# Patient Record
Sex: FEMALE | Race: WHITE | Employment: STUDENT | URBAN - METROPOLITAN AREA
[De-identification: names, ages, dates, MRNs, and addresses within clinical notes are randomized per-mention and may not be internally consistent; named-entity substitution may affect disease eponyms.]

---

## 2017-02-08 ENCOUNTER — HOSPITAL ENCOUNTER (INPATIENT)
Facility: CLINIC | Age: 22
LOS: 5 days | Discharge: HOME | DRG: 885 | End: 2017-02-13
Attending: EMERGENCY MEDICINE | Admitting: PSYCHIATRY & NEUROLOGY
Payer: COMMERCIAL

## 2017-02-08 ENCOUNTER — OFFICE VISIT (OUTPATIENT)
Dept: FAMILY MEDICINE | Facility: CLINIC | Age: 22
End: 2017-02-08

## 2017-02-08 VITALS
WEIGHT: 138.7 LBS | SYSTOLIC BLOOD PRESSURE: 102 MMHG | HEART RATE: 83 BPM | OXYGEN SATURATION: 96 % | DIASTOLIC BLOOD PRESSURE: 68 MMHG | RESPIRATION RATE: 14 BRPM | HEIGHT: 67 IN | TEMPERATURE: 98.5 F | BODY MASS INDEX: 21.77 KG/M2

## 2017-02-08 DIAGNOSIS — F43.23 ADJUSTMENT DISORDER WITH MIXED ANXIETY AND DEPRESSED MOOD: ICD-10-CM

## 2017-02-08 DIAGNOSIS — R45.851 SUICIDAL IDEATION: ICD-10-CM

## 2017-02-08 DIAGNOSIS — R45.851 SUICIDAL IDEATION: Primary | ICD-10-CM

## 2017-02-08 LAB
AMPHETAMINES UR QL SCN: NORMAL
BARBITURATES UR QL: NORMAL
BENZODIAZ UR QL: NORMAL
CANNABINOIDS UR QL SCN: NORMAL
COCAINE UR QL: NORMAL
ETHANOL UR QL SCN: NORMAL
HCG UR QL: NEGATIVE
OPIATES UR QL SCN: NORMAL

## 2017-02-08 PROCEDURE — 99285 EMERGENCY DEPT VISIT HI MDM: CPT | Performed by: EMERGENCY MEDICINE

## 2017-02-08 PROCEDURE — 12400001 ZZH R&B MH UMMC

## 2017-02-08 PROCEDURE — 99285 EMERGENCY DEPT VISIT HI MDM: CPT | Mod: Z6 | Performed by: EMERGENCY MEDICINE

## 2017-02-08 PROCEDURE — 80307 DRUG TEST PRSMV CHEM ANLYZR: CPT | Performed by: FAMILY MEDICINE

## 2017-02-08 PROCEDURE — 81025 URINE PREGNANCY TEST: CPT | Performed by: FAMILY MEDICINE

## 2017-02-08 PROCEDURE — 80320 DRUG SCREEN QUANTALCOHOLS: CPT | Performed by: FAMILY MEDICINE

## 2017-02-08 PROCEDURE — 90791 PSYCH DIAGNOSTIC EVALUATION: CPT

## 2017-02-08 RX ORDER — HYDROXYZINE HYDROCHLORIDE 25 MG/1
25-50 TABLET, FILM COATED ORAL EVERY 4 HOURS PRN
Status: DISCONTINUED | OUTPATIENT
Start: 2017-02-08 | End: 2017-02-13 | Stop reason: HOSPADM

## 2017-02-08 RX ORDER — TRAZODONE HYDROCHLORIDE 50 MG/1
50 TABLET, FILM COATED ORAL
Status: DISCONTINUED | OUTPATIENT
Start: 2017-02-08 | End: 2017-02-13 | Stop reason: HOSPADM

## 2017-02-08 ASSESSMENT — ANXIETY QUESTIONNAIRES
IF YOU CHECKED OFF ANY PROBLEMS ON THIS QUESTIONNAIRE, HOW DIFFICULT HAVE THESE PROBLEMS MADE IT FOR YOU TO DO YOUR WORK, TAKE CARE OF THINGS AT HOME, OR GET ALONG WITH OTHER PEOPLE: VERY DIFFICULT
6. BECOMING EASILY ANNOYED OR IRRITABLE: MORE THAN HALF THE DAYS
3. WORRYING TOO MUCH ABOUT DIFFERENT THINGS: MORE THAN HALF THE DAYS
5. BEING SO RESTLESS THAT IT IS HARD TO SIT STILL: MORE THAN HALF THE DAYS
2. NOT BEING ABLE TO STOP OR CONTROL WORRYING: NEARLY EVERY DAY
1. FEELING NERVOUS, ANXIOUS, OR ON EDGE: MORE THAN HALF THE DAYS
7. FEELING AFRAID AS IF SOMETHING AWFUL MIGHT HAPPEN: SEVERAL DAYS
GAD7 TOTAL SCORE: 13

## 2017-02-08 ASSESSMENT — ENCOUNTER SYMPTOMS
HYPERACTIVE: 0
AGITATION: 0
HALLUCINATIONS: 0
DECREASED CONCENTRATION: 1
ACTIVITY CHANGE: 1
FATIGUE: 1
NERVOUS/ANXIOUS: 1
CONFUSION: 0
SLEEP DISTURBANCE: 1
DYSPHORIC MOOD: 1

## 2017-02-08 ASSESSMENT — PATIENT HEALTH QUESTIONNAIRE - PHQ9: 5. POOR APPETITE OR OVEREATING: SEVERAL DAYS

## 2017-02-08 NOTE — PROGRESS NOTES
Primary Care Behavioral Health Consult Note  Meeting was: Unscheduled  Others present: Dr. Otto; Emergency Medical Service (Present for conclusion of consultation to initiate transport)  Meeting lasted: Approximately 10 Minutes    Identifying Information and Presenting Problem:  Dr. Otto requested behavioral health consultation for this patient regarding assistance initiating/implementing Erin's protocol for transporting a patient in psychiatric crisis. The patient is a 21 year old Russian female experiencing active suicidal ideation with plan (i.e., Jumping off of a bridge).    Topics Discussed/Interventions Provided:     Assisted Dr. Otto with the initiation/implementation of Erin's protocol for transporting a patient in psychiatric crisis. Following initiation of the protocol, accompanied Dr. Otto to meet with the patient to express concerns regarding safety and discuss the decision to have the patient evaluated for potential hospitalization. The patient appeared somewhat unreceptive to this initially, though appeared more receptive after continued discussion of provider concerns. Informed the patient that EMS would transport her to the [facility] ([facility]) for evaluation. Indicated she was willing to be transported via ambulance. Following initiation of transportation, assisted Dr. Otto with the remainder of Erin's protocol (i.e., Contacting [facility] to inform them of provider concerns and reason for transporting for further evaluation).     Assessment:   Mental Status: Agnes Woods appeared generally alert and oriented. Dress was casual and appropriate to the weather and occasion. Grooming and hygiene were fair. Eye contact was limited. Speech was of normal volume and rate and was clear, coherent, and relevant. Mood was depressed with congruent affect. Thought processes were relevant, logical and goal-directed. Thought content was WNL with no evidence of psychotic or  paranoid features. Reported current active suicidal ideation with plan. Memory appeared grossly intact. Insight and judgment appeared limited and patient exhibited good impulse control during the appointment.     Does the patient appear to be at imminent risk of harm to self/others at this time? Yes (See Dr. Otto's documentation for crisis and risk assessment)    PHQ-9 SCORE 2/8/2017   Total Score 24     NANCIE-7 SCORE 2/8/2017   Total Score 13     Diagnoses (PROVISIONAL):    Deferred    Plan:    Patient transported via ambulance to the [facility] for evaluation secondary to psychiatric crisis, per Erin's protocol.     Dr. Otto requested callback from the [facility].    Consulted with Marian Otto and Preeti prior to, during, and following current consultation for care coordination.       Nathaniel Lombardi, Ph.D.  Behavioral Health Fellow

## 2017-02-08 NOTE — PROGRESS NOTES
Preceptor Attestation:   Patient seen and discussed with the resident. Assessment and plan reviewed with resident and agreed upon.   Supervising Physician:  Reilly Álvarez MD  [facility]

## 2017-02-08 NOTE — IP AVS SNAPSHOT
MRN:[id number]                      After Visit Summary   2/8/2017    Agnes Woods    MRN: [id number]           Patient Information     Date Of Birth          1995        About your hospital stay     You were admitted on:  February 8, 2017 You last received care in the:  Young Adult Inpatient Mental Health    You were discharged on:  February 13, 2017       Who to Call     For medical emergencies, please call 911.  For non-urgent questions about your medical care, please call your primary care provider or clinic, [phone]          Attending Provider     Provider Specialty    Lara Blood MD Emergency Medicine    Raheem Le MD Psychiatry       Primary Care Provider Office Phone # Fax #    Phyllis Otto [phone]       [facility] [address] [facility] [address]        Further instructions from your care team       Behavioral Discharge Planning and Instructions      Summary: You were admitted on 2/8/2017 to [facility] for suicidal ideation.  You were treated by Dr. Le and discharged on 02/10/2017.     Disposition: Discharged to home    Main Diagnosis:  Major depressive disorder, recurrent.    Bipolar disorder, depressed.      Health Care Follow-up Appointments:   Therapy Appointment  Call to schedule a follow-up appointment with your therapist within 7 days of discharge if possible  Provider: Michele Huston  Phone:[phone]     Medication Management   For a primary care provider contact:  [facility] [address] [place] [address]  Phone: [phone]    For a psychiatrist contact:   [facility]. [address] [place] [address]  Phone: [phone]    For DBT Groups you may call:  [facility] for an Intake interview at Phone: [phone]      Attend all scheduled appointments with your outpatient providers. Call at least 24  "hours in advance if you need to reschedule an appointment to ensure continued access to your outpatient providers.   Major Treatments, Procedures and Findings: You were provided with: a psychiatric assessment, assessed for medical stability, medication evaluation and/or management, group therapy and milieu management    Symptoms to Report: losing more sleep, mood getting worse or thoughts of suicide    Early warning signs can include:  increased depression or anxiety sleep disturbances increased thoughts or behaviors of suicide or self-harm     Safety and Wellness:  Take all medicines as directed.  Make no changes unless your doctor suggests them.      Follow treatment recommendations.  Refrain from alcohol and non-prescribed drugs.  If there is a concern for safety, call 911.    Resources: Mental health crisis response for your [facility] is offered 24 hours a day, 7 days a week. A trained counselor will assess your current situation, offer support and counseling and connect you with local resources. Please call  [facility] Crisis (COPE) Response - Adult [phone]  [facility] Crisis Response - Adult [phone]  Text 4 Life: txt \"LIFE\" to [phone] for immediate support and crisis intervention    The treatment team has appreciated the opportunity to work with you. Agnes, please take care and make your recovery a daily recovery. If you have any questions or concerns our unit number is [phone].  You will be receiving a follow-up phone call within the next three days from a representative from behavioral health.  You have identified the best phone number to reach you as [phone]              Pending Results     No orders found from 2/6/2017 to 2/9/2017.            Admission Information     Date & Time Department Dept. Phone    2/8/2017 Young Adult Inpatient Mental Health [phone]      Your Vitals Were     Blood Pressure Pulse Temperature Respirations Weight Last Period    102/56 100 98.3  F (36.8 " " C) (Oral) 16 60.9 kg (134 lb 3.2 oz) 2017 (LMP Unknown)    Pulse Oximetry BMI (Body Mass Index)                97% 21.02 kg/m2          [employer] Information     [employer] lets you send messages to your doctor, view your test results, renew your prescriptions, schedule appointments and more. To sign up, go to [email or web address] . Click on \"Log in\" on the left side of the screen, which will take you to the Welcome page. Then click on \"Sign up Now\" on the right side of the page.     You will be asked to enter the access code listed below, as well as some personal information. Please follow the directions to create your username and password.     Your access code is: [email or web address]  Expires: 3/8/2017  6:26 PM     Your access code will  in 90 days. If you need help or a new code, please call your [place] clinic or [phone].        Care EveryWhere ID     This is your Care EveryWhere ID. This could be used by other organizations to access your [place] medical records  [id number]           Review of your medicines      CONTINUE these medicines which have NOT CHANGED        Dose / Directions    Caffeine 100 MG Tabs        Dose:  200 mg   Take 200 mg by mouth every morning   Refills:  0                Protect others around you: Learn how to safely use, store and throw away your medicines at www.disposemymeds.org.             Medication List: This is a list of all your medications and when to take them. Check marks below indicate your daily home schedule. Keep this list as a reference.      Medications           Morning Afternoon Evening Bedtime As Needed    Caffeine 100 MG Tabs   Take 200 mg by mouth every morning                                  "

## 2017-02-08 NOTE — ED NOTES
Bed: ED09  Expected date: 2/8/17  Expected time: 4:41 PM  Means of arrival: Ambulance  Comments:  Dariusz 444  21y F SI

## 2017-02-08 NOTE — MR AVS SNAPSHOT
After Visit Summary   2017    Agnes Woods    MRN: [id number]           Patient Information     Date Of Birth          1995        Visit Information        Provider Department      2017 2:40 PM Phyllis Otto MD [facility]        Today's Diagnoses     Suicidal ideation    -  1    Adjustment disorder with mixed anxiety and depressed mood           Follow-ups after your visit        Who to contact     Please call your clinic at [phone] to:    Ask questions about your health    Make or cancel appointments    Discuss your medicines    Learn about your test results    Speak to your doctor   If you have compliments or concerns about an experience at your clinic, or if you wish to file a complaint, please contact [facility] Patient Relations at [phone] or email us at [email or web address]         Additional Information About Your Visit        MyChart Information     ChartCube is an electronic gateway that provides easy, online access to your medical records. With ChartCube, you can request a clinic appointment, read your test results, renew a prescription or communicate with your care team.     To sign up for qunbt visit the website at [email or web address]   You will be asked to enter the access code listed below, as well as some personal information. Please follow the directions to create your username and password.     Your access code is: [email or web address]  Expires: 3/8/2017  6:26 PM     Your access code will  in 90 days. If you need help or a new code, please contact your [facility] or call [phone] for assistance.        Care EveryWhere ID     This is your Care EveryWhere ID. This could be used by other organizations to access your [place] medical records  [id number]        Your Vitals Were     Pulse Temperature Respirations Height Last Period Pulse Oximetry    83 98.5  " F (36.9  C) (Oral) 14 5' 7\" (170.2 cm) 01/11/2017 96%    BMI (Body Mass Index)                   21.72 kg/m2            Blood Pressure from Last 3 Encounters:   02/12/17 102/56   02/08/17 102/68   12/08/16 121/72    Weight from Last 3 Encounters:   02/12/17 134 lb 3.2 oz (60.9 kg)   02/08/17 138 lb 11.2 oz (62.9 kg)   12/08/16 138 lb (62.6 kg)              Today, you had the following     No orders found for display       Primary Care Provider Office Phone # Fax #    Phyllis Otto [phone]       [facility] [address] [facility] [address]        Thank you!     Thank you for choosing [facility]  for your care. Our goal is always to provide you with excellent care. Hearing back from our patients is one way we can continue to improve our services. Please take a few minutes to complete the written survey that you may receive in the mail after your visit with us. Thank you!             Your Updated Medication List - Protect others around you: Learn how to safely use, store and throw away your medicines at www.disposemymeds.org.      Notice  As of 2/8/2017  4:50 PM    You have not been prescribed any medications.      "

## 2017-02-08 NOTE — IP AVS SNAPSHOT
Young Adult [facility] Mental Health    [facility] [address]    [address] [address]    Phone:  [phone]                                       After Visit Summary   2/8/2017    Agnes Woods    MRN: [id number]           After Visit Summary Signature Page     I have received my discharge instructions, and my questions have been answered. I have discussed any challenges I see with this plan with the nurse or doctor.    ..........................................................................................................................................  Patient/Patient Representative Signature      ..........................................................................................................................................  Patient Representative Print Name and Relationship to Patient    ..................................................               ................................................  Date                                            Time    ..........................................................................................................................................  Reviewed by Signature/Title    ...................................................              ..............................................  Date                                                            Time

## 2017-02-09 PROCEDURE — 12400001 ZZH R&B MH UMMC

## 2017-02-09 PROCEDURE — 25000132 ZZH RX MED GY IP 250 OP 250 PS 637: Performed by: PSYCHIATRY & NEUROLOGY

## 2017-02-09 PROCEDURE — 90853 GROUP PSYCHOTHERAPY: CPT

## 2017-02-09 PROCEDURE — 97150 GROUP THERAPEUTIC PROCEDURES: CPT | Mod: GO

## 2017-02-09 RX ORDER — DOXEPIN HYDROCHLORIDE 25 MG/1
25 CAPSULE ORAL
Status: DISCONTINUED | OUTPATIENT
Start: 2017-02-09 | End: 2017-02-13 | Stop reason: HOSPADM

## 2017-02-09 RX ORDER — OLANZAPINE 10 MG/1
10 TABLET ORAL
Status: DISCONTINUED | OUTPATIENT
Start: 2017-02-09 | End: 2017-02-13 | Stop reason: HOSPADM

## 2017-02-09 RX ORDER — ALUMINA, MAGNESIA, AND SIMETHICONE 2400; 2400; 240 MG/30ML; MG/30ML; MG/30ML
30 SUSPENSION ORAL EVERY 4 HOURS PRN
Status: DISCONTINUED | OUTPATIENT
Start: 2017-02-09 | End: 2017-02-13 | Stop reason: HOSPADM

## 2017-02-09 RX ORDER — OLANZAPINE 10 MG/2ML
10 INJECTION, POWDER, FOR SOLUTION INTRAMUSCULAR
Status: DISCONTINUED | OUTPATIENT
Start: 2017-02-09 | End: 2017-02-13 | Stop reason: HOSPADM

## 2017-02-09 RX ORDER — DOXEPIN HYDROCHLORIDE 25 MG/1
25 CAPSULE ORAL
Status: DISCONTINUED | OUTPATIENT
Start: 2017-02-09 | End: 2017-02-09

## 2017-02-09 RX ORDER — ACETAMINOPHEN 325 MG/1
650 TABLET ORAL EVERY 4 HOURS PRN
Status: DISCONTINUED | OUTPATIENT
Start: 2017-02-09 | End: 2017-02-13 | Stop reason: HOSPADM

## 2017-02-09 RX ORDER — BUSPIRONE HYDROCHLORIDE 10 MG/1
10 TABLET ORAL 3 TIMES DAILY PRN
Status: DISCONTINUED | OUTPATIENT
Start: 2017-02-09 | End: 2017-02-13 | Stop reason: HOSPADM

## 2017-02-09 RX ADMIN — ACETAMINOPHEN 650 MG: 325 TABLET, FILM COATED ORAL at 19:15

## 2017-02-09 ASSESSMENT — ANXIETY QUESTIONNAIRES: GAD7 TOTAL SCORE: 13

## 2017-02-09 ASSESSMENT — PATIENT HEALTH QUESTIONNAIRE - PHQ9: SUM OF ALL RESPONSES TO PHQ QUESTIONS 1-9: 24

## 2017-02-09 NOTE — PHARMACY-ADMISSION MEDICATION HISTORY
Admission medication history interview status for the 2/8/2017 admission is complete. See Epic admission navigator for allergy information, pharmacy, prior to admission medications and immunization status.     Medication history interview sources:  Patient - reliable historian    Changes made to PTA medication list (reason)  Added: caffeine 100mg tablets daily (helps headaches)  Deleted: multivitamin daily  Changed: none    Additional medication history information (including reliability of information, actions taken by pharmacist):  - Pt was more tearful and upset when mentioned that no smoking was allowed on unit. Stated that this would be a problem for her. She has never tried nicotine products but informed her that various nicotine options were available for her if needed.    Prior to Admission medications    Medication Sig Last Dose Taking? Auth Provider   Caffeine 100 MG TABS Take 100 mg by mouth every morning 2/8/2017 at am Yes Unknown, Entered By History       Medication history completed by: Lea Van, pharmacy intern
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
## 2017-02-09 NOTE — PROGRESS NOTES
02/08/17 2259   Patient Belongings   Patient Belongings wallet;keys;jewelry;cell phone/electronics;clothing   Belongings Search Yes   Clothing Search Yes   Second Staff Corinne     In Bin  -Leather jacket  -Hat  -Scarf  -Gloves  -1 pair long socks  -boots  -backpack   -ipad   -cigarettes/lighter   -headphones   -coin purse  On Patient  -Patterned leggings  -Gray t-shirt  -Maroon cardigan  -wool skirt  -bra  -underwear  -glasses  -folders/papers from school  -Bible  -gum    In Security Envelope ([id number])  -[place] passport  -Necklace (Christianity)  -[employer] (10118)  -Visa Debit (7956)  -50 Euros    Added 2/11/17:  Tank top, 2 books, underwear, long sleeved shirt    ADMISSION:  I am responsible for any personal items that are not sent to the safe or pharmacy. [facility] is not responsible for loss, theft or damage of any property in my possession.    Patient Signature _____________________ Date/Time _____________________    Staff Signature _______________________ Date/Time _____________________    2nd Staff person, if patient is unable/unwilling to sign  ___________________________________ Date/Time _____________________    DISCHARGE:  My personal items have been returned to me.   Patient Signature _____________________ Date/Time _____________________

## 2017-02-09 NOTE — PROGRESS NOTES
"   02/09/17 1600   Occupational Therapy   Type of Intervention structured groups   Response Participates with encouragement   Hours 2   Pt. Attended 2 of 2 scheduled OT sessions today.   Observations: pt participated in groups when invited, though pt appeared irritable such as when saying \"they lock the books up don't they? So I can't have them anytime?\" and \"the ceramics and boxes, I can't keep those in my room, so what's the point?\" During PM group pt asked \"what are we doing?\" and \"what was the assignment again?\" appearing to have difficulty concentrating needing instructions repeated. Otherwise, pt did contribute to group discussion.  Group Description:   Pt participated PM topic group, in sensory exploring the senses and interventions helping the pt to regulate and deal with stressors, feelings, and anxiety through sensory input. Pt were educated in available resources on the unit and engaged in discussion regarding accessible resources for calming the senses.   Pt participated in OT clinic, working on completing a self-initiated project facilitated by OT and graded to appropriate functional performance.    "

## 2017-02-09 NOTE — ED PROVIDER NOTES
History     Chief Complaint   Patient presents with     Depression     Pt went to [facility] counseling.Pt sent to [facility] clinic. She made statements about jumping off bridge. She was sent her for eval.     Suicidal     HPI  Agnes Woods is a 21 year old female who presented to [facility] clinic today due to worsening depression and feeling suicidal.  She says she has had years of feeling depressed and feeling suicidal.  She is from [place].  She has no family here. She does have friends.  She has a therapist she sees and feels is helpful to her.  She has hx of cutting.  She was at [employer] for school and switched to [employer] [employer].  She is struggling in school and went to [facility] today hoping they could help her so that she can somehow focus on school.  They sent her here on a transport hold.  She is very upset about this and would like to leave.  Denies cd issues.  She is not on mental health meds.          I have reviewed the Medications, Allergies, Past Medical and Surgical History, and Social History in the Epic system.    Review of Systems   Psychiatric/Behavioral: Positive for suicidal ideas, self-injury, dysphoric mood and decreased concentration. Negative for hallucinations. The patient is nervous/anxious.    All other systems reviewed and are negative.      Physical Exam   BP: 149/83 mmHg  Pulse: 105  Temp: 98.4  F (36.9  C)  Resp: 16  SpO2: 98 %  Physical Exam   Constitutional: She is oriented to person, place, and time. She appears well-developed and well-nourished. No distress.   Very disheveled appearing. Poor eye contact.    HENT:   Head: Normocephalic and atraumatic.   Right Ear: External ear normal.   Left Ear: External ear normal.   Nose: Nose normal.   Mouth/Throat: Oropharynx is clear and moist.   Eyes: EOM are normal. Pupils are equal, round, and reactive to light.   Neck: Normal range of motion. Neck supple.   Cardiovascular: Normal rate, regular rhythm and normal heart sounds.    Pulmonary/Chest:  Effort normal and breath sounds normal.   Abdominal: Soft.   Musculoskeletal: Normal range of motion.   Neurological: She is alert and oriented to person, place, and time.   Skin: Skin is warm and dry. She is not diaphoretic.   Psychiatric: Her speech is normal. Her mood appears anxious. Labile: emotionally dysregulated at times. She is withdrawn (controlled appearing). Cognition and memory are normal. She expresses inappropriate judgment. She exhibits a depressed mood. She expresses suicidal ideation. She expresses suicidal plans.   Nursing note and vitals reviewed.      ED Course     Procedures           Labs Ordered and Resulted from Time of ED Arrival Up to the Time of Departure from the ED   DRUG ABUSE SCREEN 6 CHEM DEP URINE ([facility])   HCG QUALITATIVE URINE       Assessments & Plan (with Medical Decision Making)   The [facility] due to concerns for safety.  According to the note from [facility] she expressed suicidal thoughts with a plan.  Her she appears disheveled.  It appears she has not showered for several days - her hair appears unwashed.  She appears controlled and at times becomes very tearful.  She appears quite depressed.  She has poor eye contact.  She would like to leave. Both myself and the DEC  have extensive concerns about her level of depression and safety.  She seems to minimize her level of depression.    I called her therapist, after her approval, and discussed his view of her safety and to help determine her safety risk.  He states he does worry about her safety.  I have chosen to place her on a 72 hour hold given our concerns.  I did explain to her that if the admitting team felt she was doing well, she could be d/c home before the hold expires.  She will be admitted to inpatient mental health for safety concerns.  She was quite distraught over this decision.  Her mood became quite dysregulated.     I have reviewed the nursing notes.    I have  reviewed the findings, diagnosis, plan and need for follow up with the patient.    Current Discharge Medication List          Final diagnoses:   Suicidal ideation       2/8/2017   [facility], [place], EMERGENCY DEPARTMENT      Lara Blood MD  02/11/17 1539    Lara Blood MD  02/11/17 3238

## 2017-02-09 NOTE — PROGRESS NOTES
Behavioral Health  Note  Behavioral Health  Spirituality Group Note    Unit 4AW    Name: Agnes Woods    YOB: 1995   MRN: [id number]    Age: 21 year old    Patient attended -led group, which included discussion of spirituality, coping with illness and building resilience.  Patient attended group for 1 hrs.  The patient actively participated in group discussion    Jaimie Mensah  Staff   Pager [phone]

## 2017-02-09 NOTE — PROGRESS NOTES
"Initial Psychosocial Assessment    I have reviewed the chart, met with the patient, and developed Care Plan.  Information for assessment was obtained from:  Clinical Interview and chart review.      Presenting Problem:  Patient is admitted on a 72 hour hold after making suicidal statements. She reports plan to jump off bridge.  She was placed on a hold after resisting plan for admission to hospital.  Patient also has recent injuries on her arm from cutting.  Patient feels her 72 hour hold is over reaction, that it was not that serious and she did not really plan on ending her life.  She said \" Of course I have thought of how I would do it, I have thought suicide for a long time, that does not mean I will do it.\"    History of Mental Health and Chemical Dependency:  Patient reports always having suicidal thoughts.  She also has history of self-harming behaviors, in particular cutting her arms.  She reports previously being on meds, but not recently because it takes too long to see a psychiatrist.    Patient reports no problems with alcohol or drugs.  She admits very occasional marijuana use, once every couple of weeks.  She has \"6\" drinks every Friday night, her beverage of choice is wine.    Family Description (Constellation, Family Psychiatric History):  Patient is one of 2 children born to Azerbaijani parents.  She has a sister 30 years old.  Her sister has a history of anorexia, currently in full remission.  Her father has another daughter from a previoius marriage. Patient reports she barely knows her and sees her rarely.    Patient's mother is in [place] at this time teaching a class.  Father is in [place].      Significant Life Events (Illness, Abuse, Trauma, Death):  Patient moved to [place] from [place] in 2009.  She states the move was for her to get a better education.  She moved from [place] to [place] 4 years ago to go to college.    Living Situation:  Patient rents a room in a house owned by parents of a " friend in the [place] area of [place].    Educational Background:  Patient graduated High School in [place].  She attended [employer] in [place], [place] and was kicked suspended for academic reasons.  She then attended [employer] from there she transferred to [employer] which she started this semester.     Occupational History:  Not emplyed she is on an academic visa, she can have work study with that visa, but feels she should focus on settling in to her new school.     Financial Status:  Patient is dependent on her parents for financial support.    Legal Issues:  None    Ethnic/Cultural Issues:  Causian.  Grew up in [place]    Spiritual Orientation:  Raised as Omani Mormonism, but not practicing.     Service History:  No    Social Functioning (organization, interests):  Patient has friends she spends time with.  She belongs to [employer]    Current Treatment Providers are:  Therapist- Jose Huston, [phone] (weekly appointments, has one scheduled next week)  Has no PCP- Went to [facility] on [address] on referral from mental health screening clinic at school. (Not likely to go back)    Social Service Assessment/Plan:  Patient to participate in Treatment milieu.  CTC will complete aftercare plan.  Patient appears to have had a long term struggle with depression, psychiatric follow up is recommended.

## 2017-02-09 NOTE — PROGRESS NOTES
Preceptor Attestation:  I have reviewed and agree with the behavioral health fellow's documentation for this visit.  I did not see the patient.  Supervising Clinical Psychologist:  Shama Santamaria PSYD LP

## 2017-02-09 NOTE — PLAN OF CARE
Problem: General Plan of Care (Inpatient Behavioral)  Goal: Individualization/Patient Specific Goal (IP Behavioral)  The patient and/or their representative will achieve their patient-specific goals related to the plan of care.    The patient-specific goals include:  Illness Management Recovery model: Objectives    Patient will identify reason(s) for hospitalization from their perspective.  Patient will identify a minimum of three goals for discharge.  Patient will identify a minimum of three triggers that may increase their symptoms.  Patient will identify a minimum of three coping skills they can do to stay well.   Patient will identify their support system to demonstrate readiness for discharge.     Illness Management Recovery model: Objectives  Patient will identify reason(s) for hospitalization from their perspective.  Patient will identify a minimum of three goals for discharge.  Patient will identify a minimum of three triggers that may increase their symptoms.  Patient will identify a minimum of three coping skills they can do to stay well.   Patient will identify their support system to demonstrate readiness for discharge.    Illness Management & Recovery assists patient to develop relapse prevention as  patient identifies triggers for relapse.  patient identifies a general wellness strategy.  patient identifies the warning signs that they are in danger of relapse.  patient identifies someone they count on to get feedback .  patient identifies ways to take action when in danger of relapse.  patient identifies way to cope with stress or other symptoms.   patient participates in self-reflection.    02/09/17 1441   General Plan of Care Individualized   Presenting Concerns Suicidal ideation, has thought of how she would do it, chronic suicidal ideation, chronic depression, history of self injuriy, argumentative and irritable   Patient Strengths Has vocational interests i.e., hobbies;Engagement in hobbies,  sports, arts, clubs;Involved in community;Stable housing;Financial stability   Areas of Vulnerability Irritability, negative about resources and being on a hold, isolated from family   Abuse Prevention Plan Will participate in treatment milieu, will develop a safety for discharge   Patient Participation in Plan patient completed Plan of Care   Family Participation in Plan padmaja be determined by CTC, geographical issues.   Copy of Plan Given Done     Personal Plan of Care    Reasons you are in hospital  1. Depression  2. Misunderstanding by medical professonals    Goals for Discharge  1Skills for success in college  2Supporting my social network.

## 2017-02-10 PROCEDURE — 12400001 ZZH R&B MH UMMC

## 2017-02-10 PROCEDURE — H2032 ACTIVITY THERAPY, PER 15 MIN: HCPCS

## 2017-02-10 PROCEDURE — 99231 SBSQ HOSP IP/OBS SF/LOW 25: CPT | Performed by: PSYCHIATRY & NEUROLOGY

## 2017-02-10 ASSESSMENT — ACTIVITIES OF DAILY LIVING (ADL)
ORAL_HYGIENE: INDEPENDENT
GROOMING: INDEPENDENT
DRESS: INDEPENDENT
GROOMING: INDEPENDENT
ORAL_HYGIENE: INDEPENDENT
DRESS: INDEPENDENT
LAUNDRY: WITH SUPERVISION
LAUNDRY: WITH SUPERVISION

## 2017-02-10 NOTE — PROGRESS NOTES
02/10/17 1700   Art Therapy   Type of Intervention structured groups   Response participates with cues/redirection   Hours 3.5   Pt was participatory, she completed her assessment and wanted to know about the results. Writer will meet with her 1:1 on Saturday to discuss. She complains when she feels she is uninformed, not in control such as when coffee goes from caffeinated to decaffeinated at noon. 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 Her drawings are very detailed. She talks about communism and politics a lot.

## 2017-02-10 NOTE — INTERIM SUMMARY
Agnes Woods was seen today on 02/10/2017.  The patient's progress was discussed with staff.  The patient's chart was reviewed.  The patient was able to sleep fairly well last night without using any psychotropic medication regimen.  This morning the patient's vital signs are stable.  Systolic of 114/61, temperature 99.3.  We discussed the events that led to her hospitalization.  The patient admits that at times that she went to [facility] prior to being sent to [facility], an emergency service, which eventually referred the patient to us; she did check the depression scale questionnaire so high that they felt that she was actively suicidal.  Now the patient is trying to minimize the whole event, although she accepts that her expression of suicidality was present in addition to reporting cutting herself.  However, she reports that she has spoken with some friends and she has a support system.  The patient's affect is not depressed at this time.  The patient's mood is stable.  The patient denies any suicidality.  We discussed the fact that this new school seemed to be an adjustment for her, which she seemed to be accepting and wanting to return back to school.  The patient admits that transition emotionally has been difficult due to missing connectivity to lot of friends that she had in previous school.  However, she is aware that she needs to return back to her therapy and to start receiving support at the school as well and to focus on her academics.  The patient reports that she has an appointment to see her therapist on Monday around 10.  The staff feels the patient overall has been fairly stable.  Therefore treatment plan is as follows:  Mental status will be monitored over the weekend.  The patient will participate in sanders milieu.  If the patient's mental status remains stable within next 48 hours the patient will be discharged to her outpatient services for continuation of her therapy.  No  self-destructive impulses reported.      DIAGNOSES:   1.  Major depression, recurrent.   2.  Bipolar disorder, depressed.   3.  Borderline personality.      MEDICATIONS AT THIS TIME:  None.        ATTESTATION:  I certify that the patient was seen by me today and was monitored to complete a mental status exam and psychiatric evaluation was done.  At this point, the patient will be monitored and will be seen over the weekend for followup and eventually discharged on Monday.         YOSELIN VALDEZ MD             D: 02/10/2017 13:37   T: 02/10/2017 14:52   MT: GONZALO      Name:     CHRIS LANGSTON   MRN:      [id number]        Account:        [id number]   :      1995           Admit Date:                                       Discharge Date:       Document: [id number]

## 2017-02-10 NOTE — PROGRESS NOTES
"Patient presented as agitated and irritable during check-in. She was upset that she finds herself in the hospital and argues that it is unnecessary. She is also concerned that any answer she gives will \"count against\" her ability to leave the hospital when her hold is up. This concern, although valid in some respects, borders on paranoia. She wondered why I asked about her appetite and did not want to give an answer that would contribute to her staying in the hospital longer. Labile---mood swings from severe agitation, to profusely apologetic for her agitation the next second. Reported pain in legs, due to not being able to move about freely. She took some tylenol to alleviate these symptoms.          02/09/17 2100   Behavioral Health   Hallucinations denies / not responding to hallucinations   Thinking paranoid   Orientation person: oriented;place: oriented;date: oriented   Memory baseline memory   Insight poor   Judgement impaired   Eye Contact at floor;into space   Affect irritable;tense;angry   Mood labile;irritable   Physical Appearance/Attire untidy   Hygiene neglected grooming - unclean body, hair, teeth   Suicidality other (see comments)  (denies)   Self Injury other (see comment)  (denies)   Activity restless;hyperactive (agitated, impulsive)   Speech pressured;flight of ideas   Medication Sensitivity no observed side effects;no stated side effects   Psychomotor / Gait balanced;steady   Behavioral Health Interventions   Depression assist patient in following safety plan;assist patient in developing safety plan;maintain safe secure environment;establish therapeutic relationship;assist with developing and utilizing healthy coping strategies   Social and Therapeutic Interventions (Depression) encourage socialization with peers;encourage effective boundaries with peers;encourage participation in therapeutic groups and milieu activities     "

## 2017-02-10 NOTE — PROGRESS NOTES
Unable to do check in, pt attending groups. No stated SI, SIB. Active and social in milieu, occasionally tense/irritable with staff. Seemed to have a better day today, though.  Added: this writer went to 30 to borrow their keyboard (piano). Pt was very happy to have a piano to practice on, as much of her stress here revolves around falling behind in classes and in practicing. She usually practices two hours a day and has not since admission. Pt very pleasant after seeing the keyboard come onto the unit through end of shift.     02/10/17 1400   Behavioral Health   Hallucinations denies / not responding to hallucinations   Thinking paranoid   Orientation person: oriented;place: oriented;date: oriented;time: oriented   Memory baseline memory   Insight denial of illness   Judgement impaired   Eye Contact at floor;at examiner   Affect irritable;tense   Mood irritable;labile   Physical Appearance/Attire neat   Hygiene well groomed   Suicidality other (see comments)  (denies)   Self Injury other (see comment)  (denies)   Activity restless   Speech coherent   Medication Sensitivity no observed side effects;no stated side effects   Psychomotor / Gait balanced;steady   Psycho Education   Type of Intervention 1:1 intervention   Response participates, initiates socially appropriate   Hours 0.5   Treatment Detail Taking Action   Activities of Daily Living   Hygiene/Grooming independent   Oral Hygiene independent   Dress independent   Laundry with supervision   Room Organization independent   Activity   Activity Level of Assistance independent   Behavioral Health Interventions   Depression maintain safety precautions;maintain safe secure environment;build upon strengths;assist with developing and utilizing healthy coping strategies   Social and Therapeutic Interventions (Depression) encourage socialization with peers;encourage effective boundaries with peers;encourage participation in therapeutic groups and milieu activities

## 2017-02-10 NOTE — PROGRESS NOTES
Writer checked in with patient this morning. She stated she does not need anything from us. She stated she was seeing her therapist twice a week and then reduced it to 1x time a week. She stated she has discussed with her therapist about seeing him 2ce a week and has an appointment to see him on Monday at 10am. She is requesting d/c today. She denied suicidal thought. Affect is bright. She has been attending groups.     Writer informed her MD will be here later in the day and will meet with her.
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
## 2017-02-10 NOTE — PLAN OF CARE
Problem: General Plan of Care (Inpatient Behavioral)  Goal: Team Discussion  Team Plan:   Outcome: No Change  Behavioral Team Discussion: (2/10/2017)    Continued Stay Criteria/Rationale: Patient admitted for Depression and Suicidal Ideations.    Plan: MD to assess patient today.     Participants: 4A Provider: Debra Naegele, APRN, CNS and Livia Burroughs, BradenD (4A Pharmacist); 4A RN's: Eder Fish, RN and Stephanie Urbano, RN; 4A CTC's: Jan Bowie (CTC) and Diana Blackwell (CTC);.    Summary/Recommendation: Patient admitted due to worsening symptoms of Depression. Providers will assess for treatment and discharge planning.     Medical/Physical:  See medical notes.     Progress: NOT ASSESSED

## 2017-02-11 PROCEDURE — 25000132 ZZH RX MED GY IP 250 OP 250 PS 637: Performed by: EMERGENCY MEDICINE

## 2017-02-11 PROCEDURE — H2032 ACTIVITY THERAPY, PER 15 MIN: HCPCS

## 2017-02-11 PROCEDURE — 99231 SBSQ HOSP IP/OBS SF/LOW 25: CPT | Performed by: PSYCHIATRY & NEUROLOGY

## 2017-02-11 PROCEDURE — 12400001 ZZH R&B MH UMMC

## 2017-02-11 RX ADMIN — Medication 25 MG: at 22:15

## 2017-02-11 ASSESSMENT — ACTIVITIES OF DAILY LIVING (ADL)
GROOMING: INDEPENDENT
ORAL_HYGIENE: INDEPENDENT
ORAL_HYGIENE: INDEPENDENT
DRESS: INDEPENDENT
GROOMING: INDEPENDENT
DRESS: INDEPENDENT

## 2017-02-11 ASSESSMENT — ENCOUNTER SYMPTOMS
DYSPHORIC MOOD: 1
HALLUCINATIONS: 0
DECREASED CONCENTRATION: 1
NERVOUS/ANXIOUS: 1

## 2017-02-11 NOTE — INTERIM SUMMARY
"PSYCHIATRIC PROGRESS NOTE      The patient, Agnes Woods, was seen today on 02/11/2017.  The staff reports that the patient has come to them stating that two of her friends \"want to take her home.\"  Apparently the patient contacted 2 friends from her previous school that have come in stating that she can be discharged to them.  However, in my meeting with the patient, I emphasized the fact that she was detained involuntarily due to the fact that she cut herself and explicitly expressed suicidal thoughts.  However, the patient is showing typical borderline manipulative behavior by trying to convince the staff as well as myself that she will be with her friends and she would be fine.  In the meantime, the patient herself reports that she did not sleep well last night.  She did not take the doxepin which was ordered for sleep last night and when I told her that it appears that she still has very clear vulnerability to stress and her insomnia last night was one sign of it, patient states that \"no there was too much light coming into my room.\"  The patient denies any active suicidal ideation.  I emphasized the fact that on weekends we cannot discharge her even though she is not actively suicidal due to the fact that her outpatient treatment team needs to be available and be updated about her treatment prior to her discharge.  In addition to that, I suggested that she take doxepin 25 mg tonight and the order will be changed to be given as a routine order rather than p.r.n.  The patient agrees with this plan.  No new lab test is done.  No other complaint is present.  Therefore, the plan is as follows:   Doxepin will be given 25 mg at bedtime.  The patient has not used any BuSpar p.r.n. and therefore remained on a p.r.n. basis.      DIAGNOSES:   1.  Bipolar disorder, depressed.   2.  Borderline personality disorder.        Treatment plan as has been outlined above.      ATTESTATION:  I certify that the patient was seen " exclusively by me and services were provided.         YOSELIN VALDEZ MD             D: 2017 14:35   T: 2017 14:52   MT: delta      Name:     CHRIS LANGSTON   MRN:      [id number]        Account:        [id number]   :      1995           Admit Date:                                       Discharge Date:       Document: [id number]

## 2017-02-11 NOTE — PROGRESS NOTES
Pt is still frustrated about the 72 hour hold system, doesn't believe she should still be here, but is feeling better after this writer gave her access to textbooks and found a keyboard piano to borrow from another unit. Pt is now more social in milieu and more pleasant upon approach. Says she is tired from having less caffeine than she is used to.      02/10/17 1900   Behavioral Health   Hallucinations denies / not responding to hallucinations   Thinking other (see comment)  (better, adequate)   Orientation person: oriented;place: oriented;date: oriented;time: oriented   Memory baseline memory   Insight other (see comment)   Judgement (improving)   Eye Contact at examiner   Affect full range affect   Mood anxious;mood is calm   Physical Appearance/Attire neat;attire appropriate to age and situation   Hygiene well groomed   Suicidality other (see comments)  (denies)   Self Injury other (see comment)  (denies)   Activity refusal   Speech coherent;clear   Medication Sensitivity no observed side effects;no stated side effects   Psychomotor / Gait balanced;steady   Psycho Education   Type of Intervention 1:1 intervention   Response participates, initiates socially appropriate   Hours 0.5   Treatment Detail Taking Action   Activities of Daily Living   Hygiene/Grooming independent   Oral Hygiene independent   Dress independent   Laundry with supervision   Room Organization independent   Activity   Activity Level of Assistance independent   Behavioral Health Interventions   Depression maintain safety precautions;maintain safe secure environment;assist with developing and utilizing healthy coping strategies;build upon strengths   Social and Therapeutic Interventions (Depression) encourage socialization with peers;encourage effective boundaries with peers;encourage participation in therapeutic groups and milieu activities

## 2017-02-11 NOTE — PROGRESS NOTES
Two visitors, pt said visit went well. Pt signed LUCRECIA for visitors. Visitors said that pt is being held unnecessarily, mentioned they may get a  to try to get her out early, 72 hour is up on Monday. They wanted to meet with doctor. Pt still frustrated she is here. [place] that patients should get more info about rights upon admission and a copy of the law that includes the 72 hour hold info. Pt is requesting a copy of her records. Nurse was looking into details of when/how she can get them. Pt got a few new clothes and some new books. Has been playing piano and participating in some groups. Denies SI/SIB/Depression.     02/11/17 1400   Behavioral Health   Hallucinations denies / not responding to hallucinations   Thinking other (see comment)  (improving)   Orientation person: oriented;place: oriented;date: oriented;time: oriented   Memory baseline memory   Insight other (see comment);denial of illness  (believes stay was unnecessary)   Judgement (improving)   Eye Contact at examiner   Affect tense;full range affect   Mood anxious;irritable   Physical Appearance/Attire attire appropriate to age and situation;neat   Hygiene other (see comment)  (no shower today)   Suicidality other (see comments)  (denies)   Self Injury other (see comment)  (denies)   Activity restless;other (see comment)  (active and social in milieu and with visitors)   Speech coherent   Medication Sensitivity no stated side effects;no observed side effects  (difficulty sleeping, does not believe it to be med-related)   Psychomotor / Gait balanced;steady   Psycho Education   Type of Intervention 1:1 intervention   Response participates, initiates socially appropriate   Hours 0.5   Treatment Detail Check in   Activities of Daily Living   Hygiene/Grooming independent   Oral Hygiene independent   Dress independent   Room Organization independent   Activity   Activity Level of Assistance independent   Behavioral Health Interventions   Depression  maintain safety precautions;maintain safe secure environment;assist with developing and utilizing healthy coping strategies;build upon strengths   Social and Therapeutic Interventions (Depression) encourage socialization with peers;encourage effective boundaries with peers;encourage participation in therapeutic groups and milieu activities

## 2017-02-12 PROCEDURE — 12400001 ZZH R&B MH UMMC

## 2017-02-12 PROCEDURE — H2032 ACTIVITY THERAPY, PER 15 MIN: HCPCS

## 2017-02-12 PROCEDURE — 99231 SBSQ HOSP IP/OBS SF/LOW 25: CPT | Performed by: PSYCHIATRY & NEUROLOGY

## 2017-02-12 ASSESSMENT — ACTIVITIES OF DAILY LIVING (ADL)
DRESS: STREET CLOTHES
LAUNDRY: WITH SUPERVISION
ORAL_HYGIENE: INDEPENDENT
GROOMING: INDEPENDENT

## 2017-02-12 NOTE — PROGRESS NOTES
02/11/17 1800   Art Therapy   Type of Intervention structured groups   Response participates with encouragement   Hours 3   Groups today  [employer] - Self Compassion [place]  Free Choice Group  Yoga and self Compassion Meditation  Group team art project about encouragement. Pt was participatory later morning. She said she didn't sleep well because of light and room checks during the night, so she slept this morning. Pt and writer had a conversation about acceptance because she gets angry a lot about feeling like she doesn't have control here. She was mad about reflection time,but she seemed to listen to the conversation about acceptance and that being mad takes a lot of energy. She complains often, but surprisingly she liked the group process project and said she hadn't done a collaborative project before and enjoyed it.

## 2017-02-12 NOTE — PROGRESS NOTES
Pt still insistent that admission was unnecessary. Fully expressive of herself. Denies SI/SIB/depression. Pt reading, still expecting a visitor, expects that to go well.     02/12/17 1307   Behavioral Health   Hallucinations denies / not responding to hallucinations   Thinking intact   Orientation person: oriented;place: oriented;date: oriented;time: oriented   Memory baseline memory   Insight denial of illness  (believes inpatient was unnecessary)   Judgement intact   Eye Contact at examiner   Affect full range affect;tense   Mood irritable;mood is calm   Physical Appearance/Attire neat;attire appropriate to age and situation;appears stated age   Hygiene well groomed   Suicidality other (see comments)  (denies)   Self Injury other (see comment)  (denies)   Activity restless;isolative   Speech coherent   Medication Sensitivity no observed side effects;no stated side effects   Psychomotor / Gait balanced;steady   Psycho Education   Type of Intervention 1:1 intervention   Response participates, initiates socially appropriate   Hours 0.5   Activities of Daily Living   Hygiene/Grooming independent   Oral Hygiene independent   Dress street clothes   Laundry with supervision   Room Organization independent   Activity   Activity Level of Assistance independent   Behavioral Health Interventions   Depression maintain safety precautions;maintain safe secure environment;assist with developing and utilizing healthy coping strategies;build upon strengths   Social and Therapeutic Interventions (Depression) encourage socialization with peers;encourage effective boundaries with peers;encourage participation in therapeutic groups and milieu activities

## 2017-02-13 VITALS
DIASTOLIC BLOOD PRESSURE: 56 MMHG | WEIGHT: 134.2 LBS | TEMPERATURE: 98.3 F | RESPIRATION RATE: 16 BRPM | SYSTOLIC BLOOD PRESSURE: 102 MMHG | BODY MASS INDEX: 21.02 KG/M2 | OXYGEN SATURATION: 97 % | HEART RATE: 100 BPM

## 2017-02-13 PROCEDURE — 99238 HOSP IP/OBS DSCHRG MGMT 30/<: CPT | Performed by: PSYCHIATRY & NEUROLOGY

## 2017-02-13 ASSESSMENT — ACTIVITIES OF DAILY LIVING (ADL)
DRESS: INDEPENDENT
GROOMING: INDEPENDENT
ORAL_HYGIENE: INDEPENDENT
LAUNDRY: WITH SUPERVISION

## 2017-02-13 NOTE — PLAN OF CARE
Problem: Depressive Symptoms  Goal: Depressive Symptoms  Signs and symptoms of listed problems will be absent or manageable.  Patient will report decrease or absence of suicidal ideation  Patient will report decrease in depression and anxiety  Patient will report reduction in self-harm thoughts and abstain from self injurious behaviors on unit  Patient will participate in 50 percent of groups on the unit  Patient will report improved sleep    Patient, prior to discharge, will:  -verbalize decrease in depressive signs/symptoms  -verbalize a decrease in anxiety   -verbalize an understanding of medication regimen   -verbalize absence of SI/SIB   -develop a safety plan  -identify a support system   -will participate in coordination of discharge planning    To promote safety/ mental health    Patient identified the following-  Triggers:  ----------  Wellness Strategies:  ----------  Warning Signs:  ----------  Feedback (people they would like to receive feedback from if early warning signs - ex. Friends, family, partner/spouse, support groups):  ----------  Taking Action:  ----------  Ways to [place]: Music, headphones and reading.      Self-Reflection & Planning.  Assessed patient s progress completing forms related to Illness Management Recovery (including Personal Plan of Care, Adult Coping Plan, and My Support and Coping Plan) and assisted as needed.    Encouraged patient to continue to consider triggers, wellness strategies, early warning signs, feedback from others, actions to take to prevent relapse, and coping strategies as part of a plan to remain well after leaving the hospital.             Outcome: Adequate for Discharge Date Met:  02/13/17  Patient discharged from  at 1050, all belongings returned to patient. AVS discussed with patient, she denies any concerns or questions. No medications were ordered for patient at discharge. Patient did not want to sign belongings sheet because one of her items had been  "documented incorrectly (1000 Rubles had been documented as 50 Euros). Patient acknowledges that she the 1000 Rubles are hers and she did not come with 50 Euros. Patient started jumping up and down, jokingly saying \"I want my 50 euros! You wrote down that I have 50 Euros!\". Writer acknowledged that staff do need to be careful about accurate documentation, and verified that patient is not missing 50 euros. Patient still refused to sign belonging sheet even though she states she has received all of her belongings.       "

## 2017-02-13 NOTE — PLAN OF CARE
Problem: Depressive Symptoms  Goal: Depressive Symptoms  Signs and symptoms of listed problems will be absent or manageable.  Patient will report decrease or absence of suicidal ideation  Patient will report decrease in depression and anxiety  Patient will report reduction in self-harm thoughts and abstain from self injurious behaviors on unit  Patient will participate in 50 percent of groups on the unit  Patient will report improved sleep    Patient, prior to discharge, will:  -verbalize decrease in depressive signs/symptoms  -verbalize a decrease in anxiety   -verbalize an understanding of medication regimen   -verbalize absence of SI/SIB   -develop a safety plan  -identify a support system   -will participate in coordination of discharge planning    To promote safety/ mental health    Patient identified the following-  Triggers:  ----------  Wellness Strategies:  ----------  Warning Signs:  ----------  Feedback (people they would like to receive feedback from if early warning signs - ex. Friends, family, partner/spouse, support groups):  ----------  Taking Action:  ----------  Ways to [place]: Music, headphones and reading.      Self-Reflection & Planning.  Assessed patient s progress completing forms related to Illness Management Recovery (including Personal Plan of Care, Adult Coping Plan, and My Support and Coping Plan) and assisted as needed.    Encouraged patient to continue to consider triggers, wellness strategies, early warning signs, feedback from others, actions to take to prevent relapse, and coping strategies as part of a plan to remain well after leaving the hospital.             Outcome: Improving    02/12/17 2939   Depressive Symptoms   Depressive Symptoms Assessed all   Depressive Symptoms Present anxiety   48 HOUR ASSESSMENT: Pt had a quiet evening.  Calm and visible in the milieu.  Did not attend groups or socialize with peers.  Pt does pace up and down the hallway.  Did not play the piano today.   "Declined to rate her anxiety or depression stating\"I cannot rate mine, I don't see how other people can\".  States appetite is \"too good\".  Sleep is ok. Denies all medication side effect but reports she  has menstrual pain but declined pain medication.      "

## 2017-02-13 NOTE — INTERIM SUMMARY
Agnes Woods was seen today on 02/12/2017.  The patient's progress was discussed with staff.  The chart was reviewed.  During meeting today patient's affect appears to be noticeably brighter.  We reviewed events that led to her hospitalization.  We discussed the fact of how she escalated with her expression in writing and behavior (in the past cutting herself) to the point that she had to be hospitalized against her will.  We discussed a number of factors, methods and tools that she can use to deal with her tension, including communication with people that she relies on as friends and also a source of support such as that her therapist, to use a biological agent to help her to get through a tough time such as more low dose of psychotropic medications.  The third is physical activities that can be utilized in view of her cutting herself to deal with internal tension and frustration such as exercise.  The patient agrees to review these facts with her therapist.  The patient was able to sleep better after taking half a tablet of trazodone.        REVIEW OF SYSTEMS:  Does not indicate any medical difficulties or any physical complaints.        VITAL SIGNS:  The patient's vital signs are stable.  Systolic is 102 over diastolic of 56.  Temperature 99.2, pulse 100.      MENTAL STATUS:  Basically is vastly improved.  There are no suicidal ideations present.  Cognitive function is good.  There are no psychotic symptoms present.      DIAGNOSTIC IMPRESSION:   1.  Major depressive disorder, recurrent.   2.  Bipolar disorder, depressed.   3.  Borderline personality disorder.      TREATMENT PLAN:  The patient will be discharged to outpatient followup as the patient had been receiving prior to coming to the hospital after the patient's  has adequate time to contact her outpatient treatment team and to inform them of patient's progress and availability for followup treatment.        I certify that patient  exclusively was seen by me and the above-mentioned services were provided.         YOSELIN VALDEZ MD             D: 2017 16:22   T: 2017 16:41   MT: DENA      Name:     CHRIS LANGSTON   MRN:      [id number]        Account:        [id number]   :      1995           Admit Date:                                       Discharge Date:       Document: [id number]

## 2017-02-13 NOTE — DISCHARGE INSTRUCTIONS
Behavioral Discharge Planning and Instructions      Summary: You were admitted on 2/8/2017 to [facility] for suicidal ideation.  You were treated by Dr. Le and discharged on 02/10/2017.     Disposition: Discharged to home    Main Diagnosis:  Major depressive disorder, recurrent.    Bipolar disorder, depressed.      Health Care Follow-up Appointments:   Therapy Appointment  Call to schedule a follow-up appointment with your therapist within 7 days of discharge if possible  Provider: Michele Huston  Phone:[phone]     Medication Management   For a primary care provider contact:  [facility] [address] [place] [address]  Phone: [phone]    For a psychiatrist contact:   [facility] [address] [place] [address] [address] [place] [address]  Phone: [phone]    For DBT Groups you may call:  [facility] for an Intake interview at Phone: [phone]      Attend all scheduled appointments with your outpatient providers. Call at least 24 hours in advance if you need to reschedule an appointment to ensure continued access to your outpatient providers.   Major Treatments, Procedures and Findings: You were provided with: a psychiatric assessment, assessed for medical stability, medication evaluation and/or management, group therapy and milieu management    Symptoms to Report: losing more sleep, mood getting worse or thoughts of suicide    Early warning signs can include:  increased depression or anxiety sleep disturbances increased thoughts or behaviors of suicide or self-harm     Safety and Wellness:  Take all medicines as directed.  Make no changes unless your doctor suggests them.      Follow treatment recommendations.  Refrain from alcohol and non-prescribed drugs.  If there is a concern for safety, call [phone].    Resources: Mental health crisis response for your formerly [facility] is offered 24 hours a day, 7 days a week. A trained counselor  "will assess your current situation, offer support and counseling and connect you with local resources. Please call  [facility] Crisis (COPE) Response - Adult [phone]  [facility] Crisis Response - Adult [phone]  Text 4 Life: txt \"LIFE\" to 25709 for immediate support and crisis intervention    The treatment team has appreciated the opportunity to work with you. Agnes, please take care and make your recovery a daily recovery. If you have any questions or concerns our unit number is [phone].  You will be receiving a follow-up phone call within the next three days from a representative from behavioral health.  You have identified the best phone number to reach you as [phone]            "

## 2017-12-04 ENCOUNTER — OFFICE VISIT (OUTPATIENT)
Dept: FAMILY MEDICINE | Facility: CLINIC | Age: 22
End: 2017-12-04
Payer: COMMERCIAL

## 2017-12-04 VITALS
RESPIRATION RATE: 18 BRPM | TEMPERATURE: 99.1 F | SYSTOLIC BLOOD PRESSURE: 116 MMHG | BODY MASS INDEX: 22.48 KG/M2 | WEIGHT: 143.5 LBS | DIASTOLIC BLOOD PRESSURE: 76 MMHG | HEART RATE: 96 BPM | OXYGEN SATURATION: 98 %

## 2017-12-04 DIAGNOSIS — R53.82 CHRONIC FATIGUE: ICD-10-CM

## 2017-12-04 DIAGNOSIS — F33.1 MAJOR DEPRESSIVE DISORDER, RECURRENT EPISODE, MODERATE (H): Primary | ICD-10-CM

## 2017-12-04 DIAGNOSIS — Z30.011 ENCOUNTER FOR INITIAL PRESCRIPTION OF CONTRACEPTIVE PILLS: ICD-10-CM

## 2017-12-04 DIAGNOSIS — Z00.00 ROUTINE GENERAL MEDICAL EXAMINATION AT A HEALTH CARE FACILITY: ICD-10-CM

## 2017-12-04 DIAGNOSIS — Z11.3 SCREEN FOR STD (SEXUALLY TRANSMITTED DISEASE): ICD-10-CM

## 2017-12-04 LAB
ALBUMIN SERPL-MCNC: 4.2 G/DL (ref 3.4–5)
ALP SERPL-CCNC: 79 U/L (ref 40–150)
ALT SERPL W P-5'-P-CCNC: 21 U/L (ref 0–50)
ANION GAP SERPL CALCULATED.3IONS-SCNC: 10 MMOL/L (ref 3–14)
AST SERPL W P-5'-P-CCNC: 17 U/L (ref 0–45)
BASOPHILS # BLD AUTO: 0 10E9/L (ref 0–0.2)
BASOPHILS NFR BLD AUTO: 0.5 %
BILIRUB SERPL-MCNC: 0.3 MG/DL (ref 0.2–1.3)
BUN SERPL-MCNC: 10 MG/DL (ref 7–30)
CALCIUM SERPL-MCNC: 9.2 MG/DL (ref 8.5–10.1)
CHLORIDE SERPL-SCNC: 103 MMOL/L (ref 94–109)
CO2 SERPL-SCNC: 24 MMOL/L (ref 20–32)
CREAT SERPL-MCNC: 0.7 MG/DL (ref 0.52–1.04)
DEPRECATED CALCIDIOL+CALCIFEROL SERPL-MC: 16 UG/L (ref 20–75)
DIFFERENTIAL METHOD BLD: NORMAL
EOSINOPHIL # BLD AUTO: 0.3 10E9/L (ref 0–0.7)
EOSINOPHIL NFR BLD AUTO: 4.2 %
ERYTHROCYTE [DISTWIDTH] IN BLOOD BY AUTOMATED COUNT: 13.1 % (ref 10–15)
GFR SERPL CREATININE-BSD FRML MDRD: >90 ML/MIN/1.7M2
GLUCOSE SERPL-MCNC: 68 MG/DL (ref 70–99)
HBV SURFACE AB SERPL IA-ACNC: 63.16 M[IU]/ML
HCT VFR BLD AUTO: 40.9 % (ref 35–47)
HGB BLD-MCNC: 13.3 G/DL (ref 11.7–15.7)
HIV 1+2 AB+HIV1 P24 AG SERPL QL IA: NONREACTIVE
LYMPHOCYTES # BLD AUTO: 1.9 10E9/L (ref 0.8–5.3)
LYMPHOCYTES NFR BLD AUTO: 29.8 %
MCH RBC QN AUTO: 30 PG (ref 26.5–33)
MCHC RBC AUTO-ENTMCNC: 32.5 G/DL (ref 31.5–36.5)
MCV RBC AUTO: 92 FL (ref 78–100)
MONOCYTES # BLD AUTO: 0.4 10E9/L (ref 0–1.3)
MONOCYTES NFR BLD AUTO: 6.9 %
NEUTROPHILS # BLD AUTO: 3.6 10E9/L (ref 1.6–8.3)
NEUTROPHILS NFR BLD AUTO: 58.6 %
PLATELET # BLD AUTO: 359 10E9/L (ref 150–450)
POTASSIUM SERPL-SCNC: 3.8 MMOL/L (ref 3.4–5.3)
PROT SERPL-MCNC: 8.2 G/DL (ref 6.8–8.8)
RBC # BLD AUTO: 4.44 10E12/L (ref 3.8–5.2)
SODIUM SERPL-SCNC: 137 MMOL/L (ref 133–144)
TSH SERPL DL<=0.005 MIU/L-ACNC: 0.72 MU/L (ref 0.4–4)
VIT B12 SERPL-MCNC: 299 PG/ML (ref 193–986)
WBC # BLD AUTO: 6.2 10E9/L (ref 4–11)

## 2017-12-04 PROCEDURE — 86706 HEP B SURFACE ANTIBODY: CPT | Performed by: FAMILY MEDICINE

## 2017-12-04 PROCEDURE — 87389 HIV-1 AG W/HIV-1&-2 AB AG IA: CPT | Performed by: FAMILY MEDICINE

## 2017-12-04 PROCEDURE — 87491 CHLMYD TRACH DNA AMP PROBE: CPT | Performed by: FAMILY MEDICINE

## 2017-12-04 PROCEDURE — 86780 TREPONEMA PALLIDUM: CPT | Performed by: FAMILY MEDICINE

## 2017-12-04 PROCEDURE — 82607 VITAMIN B-12: CPT | Performed by: FAMILY MEDICINE

## 2017-12-04 PROCEDURE — 99395 PREV VISIT EST AGE 18-39: CPT | Performed by: FAMILY MEDICINE

## 2017-12-04 PROCEDURE — 87591 N.GONORRHOEAE DNA AMP PROB: CPT | Performed by: FAMILY MEDICINE

## 2017-12-04 PROCEDURE — 80050 GENERAL HEALTH PANEL: CPT | Performed by: FAMILY MEDICINE

## 2017-12-04 PROCEDURE — 82306 VITAMIN D 25 HYDROXY: CPT | Performed by: FAMILY MEDICINE

## 2017-12-04 PROCEDURE — 99214 OFFICE O/P EST MOD 30 MIN: CPT | Mod: 25 | Performed by: FAMILY MEDICINE

## 2017-12-04 PROCEDURE — 36415 COLL VENOUS BLD VENIPUNCTURE: CPT | Performed by: FAMILY MEDICINE

## 2017-12-04 RX ORDER — VENLAFAXINE HYDROCHLORIDE 37.5 MG/1
CAPSULE, EXTENDED RELEASE ORAL
Qty: 30 CAPSULE | Refills: 1 | Status: ON HOLD | OUTPATIENT
Start: 2017-12-04 | End: 2017-12-20

## 2017-12-04 RX ORDER — NORETHINDRONE ACETATE AND ETHINYL ESTRADIOL .03; 1.5 MG/1; MG/1
1 TABLET ORAL DAILY
Qty: 84 TABLET | Refills: 1 | Status: SHIPPED | OUTPATIENT
Start: 2017-12-04 | End: 2018-06-08

## 2017-12-04 ASSESSMENT — PATIENT HEALTH QUESTIONNAIRE - PHQ9
5. POOR APPETITE OR OVEREATING: NOT AT ALL
SUM OF ALL RESPONSES TO PHQ QUESTIONS 1-9: 13
SUM OF ALL RESPONSES TO PHQ QUESTIONS 1-9: 13
10. IF YOU CHECKED OFF ANY PROBLEMS, HOW DIFFICULT HAVE THESE PROBLEMS MADE IT FOR YOU TO DO YOUR WORK, TAKE CARE OF THINGS AT HOME, OR GET ALONG WITH OTHER PEOPLE: VERY DIFFICULT

## 2017-12-04 ASSESSMENT — ANXIETY QUESTIONNAIRES
5. BEING SO RESTLESS THAT IT IS HARD TO SIT STILL: NOT AT ALL
3. WORRYING TOO MUCH ABOUT DIFFERENT THINGS: NOT AT ALL
7. FEELING AFRAID AS IF SOMETHING AWFUL MIGHT HAPPEN: MORE THAN HALF THE DAYS
1. FEELING NERVOUS, ANXIOUS, OR ON EDGE: SEVERAL DAYS
2. NOT BEING ABLE TO STOP OR CONTROL WORRYING: SEVERAL DAYS
GAD7 TOTAL SCORE: 6
6. BECOMING EASILY ANNOYED OR IRRITABLE: MORE THAN HALF THE DAYS
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
## 2017-12-04 NOTE — MR AVS SNAPSHOT
After Visit Summary   12/4/2017    Agnes Woods    MRN: [id number]           Patient Information     Date Of Birth          1995        Visit Information        Provider Department      12/4/2017 10:20 AM Selina Asif MD [facility]        Today's Diagnoses     Major depressive disorder, recurrent episode, moderate (H)    -  1    Chronic fatigue        Screen for STD (sexually transmitted disease)        Encounter for initial prescription of contraceptive pills        Routine general medical examination at a health care facility          Care Instructions      Preventive Health Recommendations  Female Ages 18 to 25     Yearly exam:     See your health care provider every year in order to  o Review health changes.   o Discuss preventive care.    o Review your medicines if your doctor has prescribed any.      You should be tested each year for STDs (sexually transmitted diseases).       After age 20, talk to your provider about how often you should have cholesterol testing.      Starting at age 21, get a Pap test every three years. If you have an abnormal result, your doctor may have you test more often.      If you are at risk for diabetes, you should have a diabetes test (fasting glucose).     Shots:     Get a flu shot each year.     Get a tetanus shot every 10 years.     Consider getting the shot (vaccine) that prevents cervical cancer (Gardasil).    Nutrition:     Eat at least 5 servings of fruits and vegetables each day.    Eat whole-grain bread, whole-wheat pasta and brown rice instead of white grains and rice.    Talk to your provider about Calcium and Vitamin D.     Lifestyle    Exercise at least 150 minutes a week each week (30 minutes a day, 5 days a week). This will help you control your weight and prevent disease.    Limit alcohol to one drink per day.    No smoking.     Wear sunscreen to prevent skin cancer.    See your dentist every six  "months for an exam and cleaning.          Follow-ups after your visit        Who to contact     If you have questions or need follow up information about today's clinic visit or your schedule please contact [facility] directly at [phone].  Normal or non-critical lab and imaging results will be communicated to you by [employer], letter or phone within 4 business days after the clinic has received the results. If you do not hear from us within 7 days, please contact the clinic through [employer] or phone. If you have a critical or abnormal lab result, we will notify you by phone as soon as possible.  Submit refill requests through [employer] or call your pharmacy and they will forward the refill request to us. Please allow 3 business days for your refill to be completed.          Additional Information About Your Visit        [employer] Information     [employer]orse lets you send messages to your doctor, view your test results, renew your prescriptions, schedule appointments and more. To sign up, go to [email or web address] . Click on \"Log in\" on the left side of the screen, which will take you to the Welcome page. Then click on \"Sign up Now\" on the right side of the page.     You will be asked to enter the access code listed below, as well as some personal information. Please follow the directions to create your username and password.     Your access code is: [email or web address]  Expires: 3/4/2018 10:52 AM     Your access code will  in 90 days. If you need help or a new code, please call your [facility] clinic or [phone].        Care EveryWhere ID     This is your Care EveryWhere ID. This could be used by other organizations to access your [facility] medical records  [id number]        Your Vitals Were     Pulse Temperature Respirations Last Period Pulse Oximetry Breastfeeding?    96 99.1  F (37.3  C) (Tympanic) 18 2017 (Exact Date) 98% No    BMI (Body Mass Index)                   22.48 kg/m2         "    Blood Pressure from Last 3 Encounters:   12/04/17 116/76   02/12/17 102/56   02/08/17 102/68    Weight from Last 3 Encounters:   12/04/17 143 lb 8 oz (65.1 kg)   02/12/17 134 lb 3.2 oz (60.9 kg)   02/08/17 138 lb 11.2 oz (62.9 kg)              We Performed the Following     25- OH-Vitamin D     Anti Treponema     CBC with platelets differential     Chlamydia trachomatis PCR     Comprehensive metabolic panel     Hepatitis B Surface Antibody     HIV Antigen Antibody Combo     Neisseria gonorrhoeae PCR     TSH with free T4 reflex     Vitamin B12          Today's Medication Changes          These changes are accurate as of: 12/4/17 10:52 AM.  If you have any questions, ask your nurse or doctor.               Start taking these medicines.        Dose/Directions    norethindrone-ethinyl estradiol 1.5-30 MG-MCG per tablet   Commonly known as:  MICROGESTIN 1.5/30   Used for:  Encounter for initial prescription of contraceptive pills   Started by:  Selina Asif MD        Dose:  1 tablet   Take 1 tablet by mouth daily   Quantity:  84 tablet   Refills:  1       venlafaxine 37.5 MG 24 hr capsule   Commonly known as:  EFFEXOR-XR   Used for:  Major depressive disorder, recurrent episode, moderate (H)   Started by:  Selina Asif MD        Take 1 capsule daily for 14 days, then take 2 capsules daily.   Quantity:  30 capsule   Refills:  1            Where to get your medicines      These medications were sent to [facility] [place], [place] - [address]  [address], [place] [address]     Phone:  [phone]     norethindrone-ethinyl estradiol 1.5-30 MG-MCG per tablet    venlafaxine 37.5 MG 24 hr capsule                Primary Care Provider Office Phone # Fax #    Phyllis Otto [phone]       [facility] [address]   [facility] [address]        Equal Access to Services     NAT MCCONNELL AH: Brian fisher  Izabela, waavivada luanupadaha, qapaolata kaholly magana, neptali lawssilvano aj. So [facility] [phone].    ATENCIÓN: Si galdino woodson, tiene a cid disposición servicios gratuitos de asistencia lingüística. Royer al [phone].    We comply with applicable federal civil rights laws and [place] laws. We do not discriminate on the basis of race, color, national origin, age, disability, sex, sexual orientation, or gender identity.            Thank you!     Thank you for choosing [facility]  for your care. Our goal is always to provide you with excellent care. Hearing back from our patients is one way we can continue to improve our services. Please take a few minutes to complete the written survey that you may receive in the mail after your visit with us. Thank you!             Your Updated Medication List - Protect others around you: Learn how to safely use, store and throw away your medicines at www.disposemymeds.org.          This list is accurate as of: 12/4/17 10:52 AM.  Always use your most recent med list.                   Brand Name Dispense Instructions for use Diagnosis    Caffeine 100 MG Tabs      Take 200 mg by mouth every morning        norethindrone-ethinyl estradiol 1.5-30 MG-MCG per tablet    MICROGESTIN 1.5/30    84 tablet    Take 1 tablet by mouth daily    Encounter for initial prescription of contraceptive pills       venlafaxine 37.5 MG 24 hr capsule    EFFEXOR-XR    30 capsule    Take 1 capsule daily for 14 days, then take 2 capsules daily.    Major depressive disorder, recurrent episode, moderate (H)

## 2017-12-05 LAB
C TRACH DNA SPEC QL NAA+PROBE: NEGATIVE
N GONORRHOEA DNA SPEC QL NAA+PROBE: NEGATIVE
SPECIMEN SOURCE: NORMAL
SPECIMEN SOURCE: NORMAL
T PALLIDUM IGG+IGM SER QL: NEGATIVE

## 2017-12-05 ASSESSMENT — PATIENT HEALTH QUESTIONNAIRE - PHQ9: SUM OF ALL RESPONSES TO PHQ QUESTIONS 1-9: 13

## 2017-12-05 ASSESSMENT — ANXIETY QUESTIONNAIRES: GAD7 TOTAL SCORE: 6

## 2017-12-07 ENCOUNTER — TELEPHONE (OUTPATIENT)
Dept: FAMILY MEDICINE | Facility: CLINIC | Age: 22
End: 2017-12-07

## 2017-12-07 NOTE — TELEPHONE ENCOUNTER
Patient called for lab results. Ordering doctors comments given to patient.  She will get some Vitamin D as advised.  She has made an appt for a follow up to discuss her medications and complete PAP.

## 2017-12-13 ENCOUNTER — OFFICE VISIT (OUTPATIENT)
Dept: FAMILY MEDICINE | Facility: CLINIC | Age: 22
End: 2017-12-13
Payer: COMMERCIAL

## 2017-12-13 VITALS
HEART RATE: 89 BPM | SYSTOLIC BLOOD PRESSURE: 132 MMHG | WEIGHT: 147 LBS | BODY MASS INDEX: 23.02 KG/M2 | TEMPERATURE: 97.8 F | RESPIRATION RATE: 20 BRPM | DIASTOLIC BLOOD PRESSURE: 76 MMHG | OXYGEN SATURATION: 98 %

## 2017-12-13 DIAGNOSIS — S41.112A LACERATION OF LEFT UPPER EXTREMITY, INITIAL ENCOUNTER: Primary | ICD-10-CM

## 2017-12-13 DIAGNOSIS — F43.10 PTSD (POST-TRAUMATIC STRESS DISORDER): ICD-10-CM

## 2017-12-13 PROCEDURE — 99214 OFFICE O/P EST MOD 30 MIN: CPT | Performed by: FAMILY MEDICINE

## 2017-12-13 NOTE — MR AVS SNAPSHOT
After Visit Summary   12/13/2017    Agnes Woods    MRN: [id number]           Patient Information     Date Of Birth          1995        Visit Information        Provider Department      12/13/2017 6:00 PM Geoff Ash MD [facility]        Care Instructions    Change dressing daily with non stick dressing.  Keep clean and dry.  Follow-up as planned next week with primary.  Consider discussing variations of PTSD with your therapist.  Continue to ask for help if you feel thoughts of hurting yourself.  Put the crisis line in your phone.            Follow-ups after your visit        Your next 10 appointments already scheduled     Dec 21, 2017  1:20 PM CST   Office Visit with Selina Asif MD   [facility] ([facility])    [address] [place] [address] [id number] [phone]           Bring a current list of meds and any records pertaining to this visit. For Physicals, please bring immunization records and any forms needing to be filled out. Please arrive 10 minutes early to complete paperwork.              Who to contact     If you have questions or need follow up information about today's clinic visit or your schedule please contact [facility] directly at [phone].  Normal or non-critical lab and imaging results will be communicated to you by MyChart, letter or phone within 4 business days after the clinic has received the results. If you do not hear from us within 7 days, please contact the clinic through [employer] or phone. If you have a critical or abnormal lab result, we will notify you by phone as soon as possible.  Submit refill requests through [employer] or call your pharmacy and they will forward the refill request to us. Please allow 3 business days for your refill to be completed.          Additional Information About Your Visit        MyChart Information     [employer]  gives you secure access to your electronic health record. If you see a primary care provider, you can also send messages to your care team and make appointments. If you have questions, please call your primary care clinic.  If you do not have a primary care provider, please call [phone] and they will assist you.        Care EveryWhere ID     This is your Care EveryWhere ID. This could be used by other organizations to access your [facility] medical records  [id number]        Your Vitals Were     Pulse Temperature Respirations Last Period Pulse Oximetry Breastfeeding?    89 97.8  F (36.6  C) (Tympanic) 20 12/01/2017 (Exact Date) 98% No    BMI (Body Mass Index)                   23.02 kg/m2            Blood Pressure from Last 3 Encounters:   12/13/17 132/76   12/04/17 116/76   02/12/17 102/56    Weight from Last 3 Encounters:   12/13/17 147 lb (66.7 kg)   12/04/17 143 lb 8 oz (65.1 kg)   02/12/17 134 lb 3.2 oz (60.9 kg)              Today, you had the following     No orders found for display       Primary Care Provider Office Phone # Fax #    Phyllis Otto [phone]       [facility] [address] [place] [address]        Equal Access to Services     NAT MCCONNELL : Hadii julienne oquendoo Sojose martin, waaxda luqadaha, qaybta kaalmada adeegyada, neptali rocha. So [facility] [phone].    ATENCIÓN: Si habla español, tiene a cid disposición servicios gratuitos de asistencia lingüística. ame al [phone].    We comply with applicable federal civil rights laws and [place] laws. We do not discriminate on the basis of race, color, national origin, age, disability, sex, sexual orientation, or gender identity.            Thank you!     Thank you for choosing [facility]  for your care. Our goal is always to provide you with excellent care. Hearing back from our patients is one way we can continue to improve our services. Please take  a few minutes to complete the written survey that you may receive in the mail after your visit with us. Thank you!             Your Updated Medication List - Protect others around you: Learn how to safely use, store and throw away your medicines at www.disposemymeds.org.          This list is accurate as of: 12/13/17  6:45 PM.  Always use your most recent med list.                   Brand Name Dispense Instructions for use Diagnosis    Caffeine 100 MG Tabs      Take 200 mg by mouth every morning        norethindrone-ethinyl estradiol 1.5-30 MG-MCG per tablet    MICROGESTIN 1.5/30    84 tablet    Take 1 tablet by mouth daily    Encounter for initial prescription of contraceptive pills       venlafaxine 37.5 MG 24 hr capsule    EFFEXOR-XR    30 capsule    Take 1 capsule daily for 14 days, then take 2 capsules daily.    Major depressive disorder, recurrent episode, moderate (H)

## 2017-12-14 NOTE — PATIENT INSTRUCTIONS
Change dressing daily with non stick dressing.  Keep clean and dry.  Follow-up as planned next week with primary.  Consider discussing variations of PTSD with your therapist.  Continue to ask for help if you feel thoughts of hurting yourself.  Put the crisis line in your phone.
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
## 2017-12-15 ENCOUNTER — HOSPITAL ENCOUNTER (INPATIENT)
Facility: CLINIC | Age: 22
LOS: 4 days | Discharge: HOME | DRG: 883 | End: 2017-12-20
Attending: EMERGENCY MEDICINE | Admitting: PSYCHIATRY & NEUROLOGY
Payer: COMMERCIAL

## 2017-12-15 DIAGNOSIS — E55.9 VITAMIN D DEFICIENCY: ICD-10-CM

## 2017-12-15 DIAGNOSIS — S61.511A WRIST LACERATION, RIGHT, INITIAL ENCOUNTER: ICD-10-CM

## 2017-12-15 DIAGNOSIS — F41.9 ANXIETY: Primary | ICD-10-CM

## 2017-12-15 DIAGNOSIS — F10.929 ALCOHOLIC INTOXICATION WITH COMPLICATION (H): ICD-10-CM
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
## 2017-12-15 DIAGNOSIS — F33.1 MODERATE EPISODE OF RECURRENT MAJOR DEPRESSIVE DISORDER (H): ICD-10-CM

## 2017-12-15 DIAGNOSIS — Z72.89 SELF-INJURIOUS BEHAVIOR: ICD-10-CM

## 2017-12-15 LAB — ALCOHOL BREATH TEST: 0.13 (ref 0–0.01)

## 2017-12-15 PROCEDURE — 99285 EMERGENCY DEPT VISIT HI MDM: CPT | Mod: 25 | Performed by: EMERGENCY MEDICINE

## 2017-12-15 PROCEDURE — 99285 EMERGENCY DEPT VISIT HI MDM: CPT | Mod: Z6 | Performed by: EMERGENCY MEDICINE

## 2017-12-15 PROCEDURE — 12002 RPR S/N/AX/GEN/TRNK2.6-7.5CM: CPT | Mod: Z6 | Performed by: EMERGENCY MEDICINE

## 2017-12-15 NOTE — IP AVS SNAPSHOT
[address] [address]    Phone:  [phone]                                       After Visit Summary   12/15/2017    Agnes Woods    MRN: [id number]           After Visit Summary Signature Page     I have received my discharge instructions, and my questions have been answered. I have discussed any challenges I see with this plan with the nurse or doctor.    ..........................................................................................................................................  Patient/Patient Representative Signature      ..........................................................................................................................................  Patient Representative Print Name and Relationship to Patient    ..................................................               ................................................  Date                                            Time    ..........................................................................................................................................  Reviewed by Signature/Title    ...................................................              ..............................................  Date                                                            Time

## 2017-12-15 NOTE — IP AVS SNAPSHOT
MRN:[id number]                      After Visit Summary   12/15/2017    Agnes Woods    MRN: [id number]           Thank you!     Thank you for choosing [facility] for your care. Our goal is always to provide you with excellent care.        Patient Information     Date Of Birth          1995        Designated Caregiver       Most Recent Value    Caregiver    Will someone help with your care after discharge? no      About your hospital stay     You were admitted on:  December 16, 2017 You last received care in the:  [facility]    You were discharged on:  December 20, 2017       Who to Call     For medical emergencies, please call 911.  For non-urgent questions about your medical care, please call your primary care provider or clinic, [phone]          Attending Provider     Provider Specialty    Yoan Knox MD Emergency Medicine    Natanael, Richard Cam MD Emergency Medicine    Adam Mojica MD Psychiatry       Primary Care Provider Office Phone # Fax #    Selina Asif [phone]      Your next 10 appointments already scheduled     Dec 21, 2017  1:20 PM CST   Office Visit with Selina Asif MD   [facility] ([facility]    [address] [place] [id number] [phone]           Bring a current list of meds and any records pertaining to this visit. For Physicals, please bring immunization records and any forms needing to be filled out. Please arrive 10 minutes early to complete paperwork.              Further instructions from your care team        Behavioral Discharge Planning and Instructions      Summary:  You were admitted on 12/15/2017 due to self injurious behavior and not recalling the event.  You were treated by Dr. Adam Mojica MD and discharged on 12/21/17 from [facility]      Principal Diagnosis: Major Depressive Disorder, Borderline  Personality Disorder      Health Care Follow-up Appointments:   [facility] [phone]  [address] [facility]  Dr. Christie Asif Wednesday December 21st @ 1:20    Therapist  Jose Huston  [phone]  [address]  [facility]  Friday December 22nd @ 1:00      Attend all scheduled appointments with your outpatient providers. Call at least 24 hours in advance if you need to reschedule an appointment to ensure continued access to your outpatient providers.   Major Treatments, Procedures and Findings:  You were provided with: a psychiatric assessment and medication evaluation and/or management, medical treatment    Symptoms to Report: feeling more aggressive, increased confusion, losing more sleep, mood getting worse or thoughts of suicide    Early warning signs can include: increased depression or anxiety sleep disturbances increased thoughts or behaviors of suicide or self-harm  increased unusual thinking, such as paranoia or hearing voices    Safety and Wellness:  Take all medicines as directed.  Make no changes unless your doctor suggests them.      Follow treatment recommendations.  Refrain from alcohol and non-prescribed drugs.  If there is a concern for safety, call 911.    Resources:   Crisis Intervention: 418.946.3906 or 806-018-5458 (TTY: 382.878.7700).  Call anytime for help.  National [place] on Mental Illness (www.[place].org): 355.885.8560 or 019-910-9627.  Mental Health Consumer/Survivor Network of [place] (www.mhcsn.net): 565.706.8275 or 197-779-8609  [facility] Crisis (COPE) Response - Adult [phone]    The treatment team has appreciated the opportunity to work with you.     If you have any questions or concerns our unit number is [phone].  You may be receiving a follow-up phone call within the next three days from a representative from behavioral health.          Pending Results     No orders found from 12/13/2017 to 12/16/2017.            Statement of Approval      "Ordered          12/20/17 1121  I have reviewed and agree with all the recommendations and orders detailed in this document.  EFFECTIVE NOW     Approved and electronically signed by:  Adam Mojica MD             Admission Information     Date & Time Provider Department Dept. Phone    12/15/2017 Adam Mojica MD  [facility] [phone]      Your Vitals Were     Blood Pressure Pulse Temperature Respirations Height Weight    125/64 93 99.1  F (37.3  C) (Oral) 17 1.702 m (5' 7\") 64.4 kg (142 lb)    Last Period Pulse Oximetry BMI (Body Mass Index)             12/01/2017 (Exact Date) 97% 22.24 kg/m2         MyChart Information     [employer] gives you secure access to your electronic health record. If you see a primary care provider, you can also send messages to your care team and make appointments. If you have questions, please call your primary care clinic.  If you do not have a primary care provider, please call [phone] and they will assist you.        Care EveryWhere ID     This is your Care EveryWhere ID. This could be used by other organizations to access your [facility] medical records  [id number]        Equal Access to Services     NAT MCCONNELL : Hadii julienne Gurrola, bright zimmerman, kenyatta magana, neptali rocha. So [facility] [phone].    ATENCIÓN: Si habla español, tiene a cid disposición servicios gratuitos de asistencia lingüística. Llame al [phone].    We comply with applicable federal civil rights laws and [place] laws. We do not discriminate on the basis of race, color, national origin, age, disability, sex, sexual orientation, or gender identity.               Review of your medicines      START taking        Dose / Directions    DRISDOL 65487 UNITS Caps   Used for:  Vitamin D deficiency        Dose:  69329 Units   Start taking on:  12/23/2017   Take 50,000 Units by mouth every 7 days   Quantity:  4 capsule   Refills:  0       " hydrOXYzine 25 MG tablet   Commonly known as:  ATARAX   Used for:  Anxiety        Dose:  25-50 mg   Take 1-2 tablets (25-50 mg) by mouth every 4 hours as needed for anxiety   Quantity:  90 tablet   Refills:  1       lamoTRIgine 25 MG tablet   Commonly known as:  LaMICtal   Used for:  Suicide and self-inflicted injury by cutting and piercing instrument, initial encounter (H)        Dose:  25 mg   Start taking on:  12/21/2017   Take 1 tablet (25 mg) by mouth daily   Quantity:  60 tablet   Refills:  1       venlafaxine 75 MG Tb24 24 hr tablet   Commonly known as:  EFFEXOR-ER   Used for:  Anxiety   Replaces:  venlafaxine 37.5 MG 24 hr capsule        Dose:  75 mg   Start taking on:  12/21/2017   Take 1 tablet (75 mg) by mouth daily (with breakfast)   Quantity:  30 each   Refills:  1         CONTINUE these medicines which have NOT CHANGED        Dose / Directions    Caffeine 100 MG Tabs        Dose:  200 mg   Take 200 mg by mouth every morning   Refills:  0       norethindrone-ethinyl estradiol 1.5-30 MG-MCG per tablet   Commonly known as:  MICROGESTIN 1.5/30   Used for:  Encounter for initial prescription of contraceptive pills        Dose:  1 tablet   Take 1 tablet by mouth daily   Quantity:  84 tablet   Refills:  1       VITAMIN D (CHOLECALCIFEROL) PO        Dose:  5000 Units   Take 5,000 Units by mouth daily   Refills:  0         STOP taking     venlafaxine 37.5 MG 24 hr capsule   Commonly known as:  EFFEXOR-XR   Replaced by:  venlafaxine 75 MG Tb24 24 hr tablet                Where to get your medicines      These medications were sent to [place], [place] - [address]  [address] [address]     Phone:  [phone]     DRISDOL 30624 UNITS Caps    hydrOXYzine 25 MG tablet    lamoTRIgine 25 MG tablet    venlafaxine 75 MG Tb24 24 hr tablet                Protect others around you: Learn how to safely use, store and throw away your medicines at  www.disposemymeds.org.             Medication List: This is a list of all your medications and when to take them. Check marks below indicate your daily home schedule. Keep this list as a reference.      Medications           Morning Afternoon Evening Bedtime As Needed    Caffeine 100 MG Tabs   Take 200 mg by mouth every morning   Last time this was given:  200 mg on 12/20/2017  9:11 AM                                DRISDOL 42618 UNITS Caps   Take 50,000 Units by mouth every 7 days   Start taking on:  12/23/2017                                hydrOXYzine 25 MG tablet   Commonly known as:  ATARAX   Take 1-2 tablets (25-50 mg) by mouth every 4 hours as needed for anxiety                                lamoTRIgine 25 MG tablet   Commonly known as:  LaMICtal   Take 1 tablet (25 mg) by mouth daily   Start taking on:  12/21/2017   Last time this was given:  25 mg on 12/20/2017  9:11 AM                                norethindrone-ethinyl estradiol 1.5-30 MG-MCG per tablet   Commonly known as:  MICROGESTIN 1.5/30   Take 1 tablet by mouth daily   Last time this was given:  1 tablet on 12/20/2017  9:13 AM                                venlafaxine 75 MG Tb24 24 hr tablet   Commonly known as:  EFFEXOR-ER   Take 1 tablet (75 mg) by mouth daily (with breakfast)   Start taking on:  12/21/2017   Last time this was given:  75 mg on 12/20/2017  9:11 AM                                VITAMIN D (CHOLECALCIFEROL) PO   Take 5,000 Units by mouth daily

## 2017-12-16 PROBLEM — Z72.89 SELF-INJURIOUS BEHAVIOR: Status: ACTIVE | Noted: 2017-12-16

## 2017-12-16 PROBLEM — F32.A DEPRESSION: Status: ACTIVE | Noted: 2017-12-16

## 2017-12-16 LAB
AMPHETAMINES UR QL SCN: NEGATIVE
BARBITURATES UR QL: NEGATIVE
BENZODIAZ UR QL: NEGATIVE
CANNABINOIDS UR QL SCN: NEGATIVE
COCAINE UR QL: NEGATIVE
ETHANOL UR QL SCN: POSITIVE
HCG UR QL: NEGATIVE
OPIATES UR QL SCN: NEGATIVE

## 2017-12-16 PROCEDURE — 99223 1ST HOSP IP/OBS HIGH 75: CPT | Mod: AI | Performed by: NURSE PRACTITIONER

## 2017-12-16 PROCEDURE — 90715 TDAP VACCINE 7 YRS/> IM: CPT | Performed by: EMERGENCY MEDICINE

## 2017-12-16 PROCEDURE — 90471 IMMUNIZATION ADMIN: CPT | Performed by: EMERGENCY MEDICINE

## 2017-12-16 PROCEDURE — 81025 URINE PREGNANCY TEST: CPT | Performed by: FAMILY MEDICINE

## 2017-12-16 PROCEDURE — 90791 PSYCH DIAGNOSTIC EVALUATION: CPT

## 2017-12-16 PROCEDURE — 80307 DRUG TEST PRSMV CHEM ANLYZR: CPT | Performed by: FAMILY MEDICINE

## 2017-12-16 PROCEDURE — 25000132 ZZH RX MED GY IP 250 OP 250 PS 637: Performed by: PSYCHIATRY & NEUROLOGY

## 2017-12-16 PROCEDURE — 25000125 ZZHC RX 250: Performed by: EMERGENCY MEDICINE

## 2017-12-16 PROCEDURE — 0HQDXZZ REPAIR RIGHT LOWER ARM SKIN, EXTERNAL APPROACH: ICD-10-PCS | Performed by: EMERGENCY MEDICINE

## 2017-12-16 PROCEDURE — 12400007 ZZH R&B MH INTERMEDIATE UMMC

## 2017-12-16 PROCEDURE — 12002 RPR S/N/AX/GEN/TRNK2.6-7.5CM: CPT | Performed by: EMERGENCY MEDICINE

## 2017-12-16 PROCEDURE — 80320 DRUG SCREEN QUANTALCOHOLS: CPT | Performed by: FAMILY MEDICINE

## 2017-12-16 PROCEDURE — 25000125 ZZHC RX 250

## 2017-12-16 PROCEDURE — 25000128 H RX IP 250 OP 636: Performed by: EMERGENCY MEDICINE

## 2017-12-16 RX ORDER — ALUMINA, MAGNESIA, AND SIMETHICONE 2400; 2400; 240 MG/30ML; MG/30ML; MG/30ML
30 SUSPENSION ORAL EVERY 4 HOURS PRN
Status: DISCONTINUED | OUTPATIENT
Start: 2017-12-16 | End: 2017-12-20 | Stop reason: HOSPADM

## 2017-12-16 RX ORDER — HYDROXYZINE HYDROCHLORIDE 25 MG/1
25-50 TABLET, FILM COATED ORAL EVERY 4 HOURS PRN
Status: DISCONTINUED | OUTPATIENT
Start: 2017-12-16 | End: 2017-12-18

## 2017-12-16 RX ORDER — LIDOCAINE HYDROCHLORIDE AND EPINEPHRINE 10; 10 MG/ML; UG/ML
INJECTION, SOLUTION INFILTRATION; PERINEURAL
Status: COMPLETED
Start: 2017-12-16 | End: 2017-12-16

## 2017-12-16 RX ORDER — ACETAMINOPHEN 325 MG/1
650 TABLET ORAL EVERY 4 HOURS PRN
Status: DISCONTINUED | OUTPATIENT
Start: 2017-12-16 | End: 2017-12-20 | Stop reason: HOSPADM

## 2017-12-16 RX ORDER — HYDROXYZINE HYDROCHLORIDE 25 MG/1
25-50 TABLET, FILM COATED ORAL EVERY 4 HOURS PRN
Status: DISCONTINUED | OUTPATIENT
Start: 2017-12-16 | End: 2017-12-20 | Stop reason: HOSPADM

## 2017-12-16 RX ORDER — NORETHINDRONE ACETATE AND ETHINYL ESTRADIOL .03; 1.5 MG/1; MG/1
1 TABLET ORAL DAILY
Status: DISCONTINUED | OUTPATIENT
Start: 2017-12-16 | End: 2017-12-20 | Stop reason: HOSPADM

## 2017-12-16 RX ORDER — OLANZAPINE 10 MG/2ML
2.5 INJECTION, POWDER, FOR SOLUTION INTRAMUSCULAR
Status: DISCONTINUED | OUTPATIENT
Start: 2017-12-16 | End: 2017-12-20 | Stop reason: HOSPADM

## 2017-12-16 RX ORDER — BUPIVACAINE HYDROCHLORIDE 5 MG/ML
INJECTION, SOLUTION EPIDURAL; INTRACAUDAL
Status: DISCONTINUED
Start: 2017-12-16 | End: 2017-12-16 | Stop reason: WASHOUT

## 2017-12-16 RX ORDER — CAFFEINE 200 MG
200 TABLET ORAL EVERY MORNING
Status: DISCONTINUED | OUTPATIENT
Start: 2017-12-17 | End: 2017-12-20 | Stop reason: HOSPADM

## 2017-12-16 RX ORDER — BUPIVACAINE HYDROCHLORIDE AND EPINEPHRINE 5; 5 MG/ML; UG/ML
10 INJECTION, SOLUTION EPIDURAL; INTRACAUDAL; PERINEURAL ONCE
Status: DISCONTINUED | OUTPATIENT
Start: 2017-12-16 | End: 2017-12-19 | Stop reason: CLARIF

## 2017-12-16 RX ORDER — VENLAFAXINE HYDROCHLORIDE 75 MG/1
75 TABLET, EXTENDED RELEASE ORAL
Status: DISCONTINUED | OUTPATIENT
Start: 2017-12-17 | End: 2017-12-20 | Stop reason: HOSPADM

## 2017-12-16 RX ORDER — BISACODYL 10 MG
10 SUPPOSITORY, RECTAL RECTAL DAILY PRN
Status: DISCONTINUED | OUTPATIENT
Start: 2017-12-16 | End: 2017-12-20 | Stop reason: HOSPADM

## 2017-12-16 RX ORDER — OLANZAPINE 2.5 MG/1
2.5 TABLET, FILM COATED ORAL
Status: DISCONTINUED | OUTPATIENT
Start: 2017-12-16 | End: 2017-12-20 | Stop reason: HOSPADM

## 2017-12-16 RX ORDER — ERGOCALCIFEROL 1.25 MG/1
50000 CAPSULE, LIQUID FILLED ORAL
Status: DISCONTINUED | OUTPATIENT
Start: 2017-12-16 | End: 2017-12-20 | Stop reason: HOSPADM

## 2017-12-16 RX ADMIN — CHOLECALCIFEROL CAP 125 MCG (5000 UNIT) 5000 UNITS: 125 CAP at 17:38

## 2017-12-16 RX ADMIN — BENZOCAINE, MENTHOL 1 LOZENGE: 15; 3.6 LOZENGE ORAL at 17:42

## 2017-12-16 RX ADMIN — CLOSTRIDIUM TETANI TOXOID ANTIGEN (FORMALDEHYDE INACTIVATED), CORYNEBACTERIUM DIPHTHERIAE TOXOID ANTIGEN (FORMALDEHYDE INACTIVATED), BORDETELLA PERTUSSIS TOXOID ANTIGEN (GLUTARALDEHYDE INACTIVATED), BORDETELLA PERTUSSIS FILAMENTOUS HEMAGGLUTININ ANTIGEN (FORMALDEHYDE INACTIVATED), BORDETELLA PERTUSSIS PERTACTIN ANTIGEN, AND BORDETELLA PERTUSSIS FIMBRIAE 2/3 ANTIGEN 0.5 ML: 5; 2; 2.5; 5; 3; 5 INJECTION, SUSPENSION INTRAMUSCULAR at 03:42

## 2017-12-16 RX ADMIN — NORETHINDRONE ACETATE AND ETHINYL ESTRADIOL 1 TABLET: 1.5; 3 TABLET ORAL at 17:39

## 2017-12-16 RX ADMIN — LIDOCAINE HYDROCHLORIDE AND EPINEPHRINE: 10; 10 INJECTION, SOLUTION INFILTRATION; PERINEURAL at 01:39

## 2017-12-16 RX ADMIN — ACETAMINOPHEN 650 MG: 325 TABLET, FILM COATED ORAL at 20:05

## 2017-12-16 ASSESSMENT — ENCOUNTER SYMPTOMS
COLOR CHANGE: 0
HEADACHES: 0
WOUND: 1
DYSPHORIC MOOD: 1
SHORTNESS OF BREATH: 0
ABDOMINAL PAIN: 0
EYE REDNESS: 0
ARTHRALGIAS: 0
FEVER: 0
NECK STIFFNESS: 0
DIFFICULTY URINATING: 0
CONFUSION: 0

## 2017-12-16 ASSESSMENT — ACTIVITIES OF DAILY LIVING (ADL)
FALL_HISTORY_WITHIN_LAST_SIX_MONTHS: NO
RETIRED_EATING: 0-->INDEPENDENT
AMBULATION: 0-->INDEPENDENT
COGNITION: 0 - NO COGNITION ISSUES REPORTED
HYGIENE/GROOMING: INDEPENDENT
DRESS: 0-->INDEPENDENT
DRESS: SCRUBS (BEHAVIORAL HEALTH)
TOILETING: 0-->INDEPENDENT
SWALLOWING: 0-->SWALLOWS FOODS/LIQUIDS WITHOUT DIFFICULTY
ORAL_HYGIENE: INDEPENDENT
RETIRED_COMMUNICATION: 0-->UNDERSTANDS/COMMUNICATES WITHOUT DIFFICULTY
BATHING: 0-->INDEPENDENT
TRANSFERRING: 0-->INDEPENDENT

## 2017-12-16 NOTE — PROGRESS NOTES
12/16/17 1052   Patient Belongings   Did you bring any home meds/supplements to the hospital?  No   Patient Belongings cell phone/electronics;clothing;shoes;purse   Disposition of Belongings Kept with patient;Locker;Sent to security per site process   Belongings Search Yes   Clothing Search Yes   Second Staff Erika RICHMOND   With PT: 4 books, underwear, bra    Locker: Purse( coin purse, , headphones, paperwork, passport, flip phone), boots, socks, jacket, tights, 2 sweaters, cigarettes,     SECURITY ( #[id number]); [address], [employer] [phone]    12/15/17 PT HAD FRIEND TAKE PURSE AND CELL PHONE AND  HOME WITH HIM. Dee Dee INGRAM.  SHADIA               Admission:  I am responsible for any personal items that are not sent to the safe or pharmacy.  [facility] is not responsible for loss, theft or damage of any property in my possession.    Signature:  _________________________________ Date: _______  Time: _____                                              Staff Signature:  ____________________________ Date: ________  Time: _____      2nd Staff person, if patient is unable/unwilling to sign:    Signature: ________________________________ Date: ________  Time: _____     Discharge:  [facility] has returned all of my personal belongings:    Signature: _________________________________ Date: ________  Time: _____                                          Staff Signature:  ____________________________ Date: ________  Time: _____

## 2017-12-16 NOTE — ED PROVIDER NOTES
"  History     Chief Complaint   Patient presents with      - MENTAL HEALTH     Pt cut herself on her right wrist to feel better.     Laceration     Pt has a laceration to her right wrist. Ace dressing applied and controlling the bleeding.      Addiction Problem     Pt drinks alcohol most days. Breathelizer 0.128.     HPI  Agnes Woods is a 22 year old female who to the emergency department with a self-inflicted laceration to her right wrist.  The patient states that she was feeling bad and lacerated her wrist.  She deflects questions about suicide ideation and ultimately does not answer them.  The patient states that she \"blacked out\" and does not recall lacerating her wrist.  She has reported history of depression.  She recently started Effexor.  She also reports that she has been drinking alcohol tonight.  She denies any recent illness.  She denies paranoia and hallucinations.  I have reviewed the Medications, Allergies, Past Medical and Surgical History, and Social History in the Epic system.    Review of Systems   Constitutional: Negative for fever.   HENT: Negative for congestion.    Eyes: Negative for redness.   Respiratory: Negative for shortness of breath.    Cardiovascular: Negative for chest pain.   Gastrointestinal: Negative for abdominal pain.   Genitourinary: Negative for difficulty urinating.   Musculoskeletal: Negative for arthralgias and neck stiffness.   Skin: Positive for wound. Negative for color change.   Neurological: Negative for headaches.   Psychiatric/Behavioral: Positive for dysphoric mood and self-injury. Negative for confusion.   All other systems reviewed and are negative.      Physical Exam   BP: 109/56  Pulse: 85  Temp: 97.8  F (36.6  C)  Resp: 16  Height: 170.2 cm (5' 7\")  Weight: 62.1 kg (137 lb)  SpO2: 99 %      Physical Exam   Constitutional: She appears well-developed and well-nourished. No distress.   HENT:   Head: Normocephalic and atraumatic.   Mouth/Throat: " Oropharynx is clear and moist. No oropharyngeal exudate.   Eyes: Pupils are equal, round, and reactive to light. No scleral icterus.   Cardiovascular: Normal rate, regular rhythm, normal heart sounds and intact distal pulses.    Pulses:       Radial pulses are 2+ on the right side   Pulmonary/Chest: Effort normal and breath sounds normal. No respiratory distress.   Abdominal: Soft. Bowel sounds are normal. There is no tenderness.   Musculoskeletal: She exhibits no edema or tenderness.        Arms:       Right hand: She exhibits normal range of motion and normal capillary refill. Normal sensation noted. Normal strength noted.   Neurological: She has normal strength. No sensory deficit.   Skin: Skin is warm. No rash noted. She is not diaphoretic.   Nursing note and vitals reviewed.      ED Course     ED Course     Procedures        Laceration repaired by Dr. Blood.  Please see her note for complete details    Critical Care time:             Labs Ordered and Resulted from Time of ED Arrival Up to the Time of Departure from the ED   DRUG ABUSE SCREEN 6 CHEM DEP URINE ([facility]) - Abnormal; Notable for the following:        Result Value    Ethanol Qual Urine Positive (*)     All other components within normal limits   ALCOHOL BREATH TEST POCT - Abnormal; Notable for the following:     Alcohol Breath Test 0.128 (*)     All other components within normal limits   HCG QUALITATIVE URINE     Results for orders placed or performed during the hospital encounter of 12/15/17   Drug abuse screen 6 urine (tox)   Result Value Ref Range    Amphetamine Qual Urine Negative NEG^Negative    Barbiturates Qual Urine Negative NEG^Negative    Benzodiazepine Qual Urine Negative NEG^Negative    Cannabinoids Qual Urine Negative NEG^Negative    Cocaine Qual Urine Negative NEG^Negative    Ethanol Qual Urine Positive (A) NEG^Negative    Opiates Qualitative Urine Negative NEG^Negative   HCG qualitative urine   Result Value Ref Range    HCG Qual Urine  Negative NEG^Negative   Alcohol breath test POCT   Result Value Ref Range    Alcohol Breath Test 0.128 (A) 0.00 - 0.01      Medications   bupivacaine 0.5% - EPINEPHrine 1:200,000 (PF) 0.5% -1:[id number] injection 10 mL (10 mLs Intradermal Canceled Entry 12/16/17 0138)   lidocaine 1% with EPINEPHrine 1:100,000 1 %-1:151873 injection (  Given by Other 12/16/17 0139)   Tdap (tetanus-diphtheria-acell pertussis) (ADACEL) injection 0.5 mL (0.5 mLs Intramuscular Given 12/16/17 0342)       See BEC assessment.    Assessments & Plan (with Medical Decision Making)   22 year old female with history of depression in the emergency department with large, deep laceration to her right wrist that is self-inflicted.  She deflects questions about suicide ideation and attempted suicide.  The injury is substantial and a definite risk to her.  The patient does not have any neurovascular compromise.  The wound is repaired.  She is also intoxicated.  Patient placed on a 72 hour hold.  She will be admitted to psychiatric services for further evaluation and management.  She appears medically stable for psychiatric admission.    I have reviewed the nursing notes.    I have reviewed the findings, diagnosis, plan and need for follow up with the patient.    New Prescriptions    No medications on file       Final diagnoses:   Self-injurious behavior   Wrist laceration, right, initial encounter   Alcoholic intoxication with complication (H)       12/15/2017   [facility], [place], EMERGENCY DEPARTMENT     Yoan Knox MD  12/16/17 2842

## 2017-12-16 NOTE — ED NOTES
Attempted to call report to [facility]. They are unable to take report right now. Anticipate admission on days.

## 2017-12-16 NOTE — PROGRESS NOTES
"Visited with pt on the basis of EPC requested for spiritual support of the pt. Reflected with pt around her hospital experience, sources of spiritual and emotional support and current spiritual health needs. Pt talked about her current situation and what it means for her. Pt has been cried most of the visit.  During my conversation with the her, she reported that, she is un-happy and also worries about her future.    Emotional support. Reflective conversation integrating illness elements and family spiritual narratives. Active listening.  Encouraged her to focused present.        Pt received spiritual support and reflective conversation in the context of this hospitalization. Pt expressed her appreciation for the visit and said \" I am feeling better after you talk to me\"   I will inform unit   "

## 2017-12-16 NOTE — PROGRESS NOTES
"Luis who goes by Socorro was admitted following a \"blackout\" where she cut her right forearm.  There are a number of stitches and the nurse from the ED said it was quite deep but did not cut any tendons.  She was cooperative with the admission up to a point but got bored and stopped the interview during the lengthy suicide question assessment.    Said she was never suicidal and doesn't want to be in the hospital.  She is on a 72 hour hold placed on her in the ED.  She is [place], came to the [place] in [place] and speaks English quite well.  She lives with a roommate.  Some of her answers were rather vague and guarded.  "
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
## 2017-12-16 NOTE — ED NOTES
The patient has a 5 cm vertical laceration to right wrist through dermis.  Tendon does not appear to be injured.  No vascular compromise.  Neuro intact.  Her wrrist wound was anesth with lidocaine 1 5 with epi.  7 cc used.  Wound irrigated with syringe/saline.  Wrist laceration closed with running suture using 4-0 ethilon.  No deeps placed due to concern they would cause more irritation to tissue in the wrist region.  She tolerated the procedure well. Unknown tetanus status. Being given tdap here.      Lara Blood MD  12/16/17 0217       Lara Blood MD  12/16/17 0223

## 2017-12-17 PROCEDURE — 12400007 ZZH R&B MH INTERMEDIATE UMMC

## 2017-12-17 PROCEDURE — 25000132 ZZH RX MED GY IP 250 OP 250 PS 637: Performed by: PSYCHIATRY & NEUROLOGY

## 2017-12-17 RX ADMIN — CHOLECALCIFEROL CAP 125 MCG (5000 UNIT) 5000 UNITS: 125 CAP at 09:07

## 2017-12-17 RX ADMIN — CAFFEINE 200 MG: 200 TABLET ORAL at 09:07

## 2017-12-17 RX ADMIN — VENLAFAXINE HYDROCHLORIDE 75 MG: 75 TABLET, EXTENDED RELEASE ORAL at 09:07

## 2017-12-17 RX ADMIN — NORETHINDRONE ACETATE AND ETHINYL ESTRADIOL 1 TABLET: 1.5; 3 TABLET ORAL at 09:08

## 2017-12-17 ASSESSMENT — ACTIVITIES OF DAILY LIVING (ADL)
HYGIENE/GROOMING: INDEPENDENT
DRESS: STREET CLOTHES
ORAL_HYGIENE: INDEPENDENT

## 2017-12-17 NOTE — PROGRESS NOTES
12/17/17 1456   Behavioral Health   Hallucinations denies / not responding to hallucinations   Thinking intact   Orientation person, disoriented;place, disoriented;date, disoriented;time, disoriented   Memory baseline memory   Insight admits / accepts   Judgement impaired   Eye Contact at examiner   Affect full range affect   Mood mood is calm   Physical Appearance/Attire appears stated age;neat   Suicidality other (see comments)  (denies)   1. Wish to be Dead No   2. Non-Specific Active Suicidal Thoughts  No   3. Active Sucidal Ideation with any Methods (Not Plan) Without Intent to Act  No   Self Injury other (see comment)  (denies)   Elopement (no attempts)   Activity other (see comment)  (in milieu)   Speech clear;coherent   Psychomotor / Gait balanced;steady   Psycho Education   Type of Intervention 1:1 intervention   Response participates, initiates socially appropriate   Hours 0.5   Behavioral Health Interventions   Depression maintain safe secure environment   Social and Therapeutic Interventions (Depression) encourage participation in therapeutic groups and milieu activities   Pt. Was in periphery of milieu much of shift.  Pt. Is pleasant and social with staff and select peers.  Pt. Was visited by some friends this shift and pt. Stated she really enjoyed visit.

## 2017-12-17 NOTE — PROGRESS NOTES
Said she was feeling depressed during check-in. Was withdrawn this evening and appeared confused at times while figuring out unit routines. Had two visitors this evening. She seemed very happy to see her visitors and was also excited to have more clothes. Was calm and her affect was blunted and flat. Was brief during check-in and didn't seem to have the best concentration while responding to check-in questions.      12/16/17 2217   Behavioral Health   Hallucinations denies / not responding to hallucinations   Thinking distractable;poor concentration   Orientation person: oriented;place: oriented;date: oriented;time: oriented   Memory baseline memory   Insight insight appropriate to situation;insight appropriate to events   Judgement impaired   Eye Contact at examiner   Affect blunted, flat   Mood depressed;mood is calm   Physical Appearance/Attire disheveled   Hygiene neglected grooming - unclean body, hair, teeth   Suicidality other (see comments)  (none stated )   Enviromental Risk Factors None   Self Injury other (see comment)  (n/a)   Elopement (n/a)   Activity withdrawn   Speech clear   Medication Sensitivity no stated side effects;no observed side effects   Psychomotor / Gait balanced;steady   Psycho Education   Type of Intervention 1:1 intervention   Response participates, initiates socially appropriate   Hours 0.5   Treatment Detail check-in    Group Therapy Session   Group Attendance attended group session   Time Session Began 1700   Time Session Ended 1715   Total Time (minutes) 15   Group Type community   Hygiene Care   Pre Procedure Shower / Bath / Shampoo  (did not preform )   Activities of Daily Living   Hygiene/Grooming independent   Oral Hygiene independent   Dress scrubs (behavioral health)   Room Organization independent   Behavioral Health Interventions   Depression maintain safety precautions   Social and Therapeutic Interventions (Depression) encourage socialization with peers;encourage  participation in therapeutic groups and milieu activities
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
## 2017-12-17 NOTE — PROGRESS NOTES
"/69  Pulse 115  Temp 99  F (37.2  C) (Oral)  Resp 16  Ht 1.702 m (5' 7\")  Wt 64.9 kg (143 lb)  LMP 12/01/2017 (Exact Date)  SpO2 97%  BMI 22.4 kg/m2    Pt reported pain in right forearm and fingers. Vitals taken and WNL. Pulse present in fingers and wrist. Skin dry and warm to the touch. Pt was able to move fingers and hand. However, for a second opinion, on-call moonlighter paged, and consulted with pt. Please refer to accompanying notes.    Pt given ibuprofen and warm pack for pain. No additional concerns at this time. Will continue to assess and offer support.  "
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
## 2017-12-18 PROCEDURE — 25000132 ZZH RX MED GY IP 250 OP 250 PS 637: Performed by: PSYCHIATRY & NEUROLOGY

## 2017-12-18 PROCEDURE — 99233 SBSQ HOSP IP/OBS HIGH 50: CPT | Performed by: PSYCHIATRY & NEUROLOGY

## 2017-12-18 PROCEDURE — 12400007 ZZH R&B MH INTERMEDIATE UMMC

## 2017-12-18 RX ORDER — LAMOTRIGINE 25 MG/1
25 TABLET ORAL DAILY
Status: DISCONTINUED | OUTPATIENT
Start: 2017-12-18 | End: 2017-12-20 | Stop reason: HOSPADM

## 2017-12-18 RX ADMIN — CAFFEINE 200 MG: 200 TABLET ORAL at 08:10

## 2017-12-18 RX ADMIN — NORETHINDRONE ACETATE AND ETHINYL ESTRADIOL 1 TABLET: 1.5; 3 TABLET ORAL at 08:10

## 2017-12-18 RX ADMIN — VENLAFAXINE HYDROCHLORIDE 75 MG: 75 TABLET, EXTENDED RELEASE ORAL at 08:10

## 2017-12-18 RX ADMIN — LAMOTRIGINE 25 MG: 25 TABLET ORAL at 16:34

## 2017-12-18 RX ADMIN — CHOLECALCIFEROL CAP 125 MCG (5000 UNIT) 5000 UNITS: 125 CAP at 08:10

## 2017-12-18 ASSESSMENT — ACTIVITIES OF DAILY LIVING (ADL)
ORAL_HYGIENE: INDEPENDENT
GROOMING: INDEPENDENT
DRESS: INDEPENDENT
LAUNDRY: WITH SUPERVISION
GROOMING: INDEPENDENT
DRESS: INDEPENDENT
ORAL_HYGIENE: INDEPENDENT

## 2017-12-18 NOTE — PROGRESS NOTES
Pt visible in the milieu for the entirety of the evening. She states that the pain in her right hand and forearm is improved, and is able to move her hand and fingers without pain. Pt attended and participated in Community meeting.     No additional concerns at this time. Will continue to assess and offer support.

## 2017-12-18 NOTE — PROGRESS NOTES
12/18/17 1516   Behavioral Health   Hallucinations denies / not responding to hallucinations   Thinking distractable   Orientation person: oriented;place: oriented;date: oriented   Memory baseline memory   Insight poor   Judgement impaired   Eye Contact at examiner   Affect blunted, flat   Mood mood is calm   Physical Appearance/Attire attire appropriate to age and situation   Hygiene well groomed   Suicidality other (see comments)  (Denies)   1. Wish to be Dead No   2. Non-Specific Active Suicidal Thoughts  No   Self Injury other (see comment)  (denies)   Elopement (nothing to report)   Activity isolative   Speech clear;coherent   Medication Sensitivity no observed side effects   Psychomotor / Gait balanced;steady   Psycho Education   Type of Intervention 1:1 intervention   Response participates, initiates socially appropriate   Hours 0.5   Treatment Detail (Check in)   Activities of Daily Living   Hygiene/Grooming independent   Oral Hygiene independent   Dress independent   Room Organization independent   Behavioral Health Interventions   Depression establish therapeutic relationship;build upon strengths   Social and Therapeutic Interventions (Depression) encourage participation in therapeutic groups and milieu activities   The patient was awake and out of her room most of the shift, but was reading in her room for about an hour.  The patient was out for both meals.  The patient was not social with peers during this shift and mostly reading or pacing.  The patient reports that she is not having SI or SiB thoughts.  The patient was prompted to talk about her feeling, her meeting with her doctor, and what her aftercare plans are.  The patient was resistant to engage in this and gave single word answer or avoided answering.
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
## 2017-12-18 NOTE — PROGRESS NOTES
Sated that her depression was moderate today. Had two visitors and see seemed to enjoy their company and that they brought instruments to play for her. Attended group and music group this evening. Mood was blunted and flat this evening. Minimal responses during check-in.        12/17/17 0448   Behavioral Health   Hallucinations denies / not responding to hallucinations   Thinking intact   Orientation person: oriented;place: oriented;date: oriented;time: oriented   Memory baseline memory   Insight admits / accepts   Judgement impaired   Eye Contact at examiner   Affect blunted, flat   Mood mood is calm   Physical Appearance/Attire appears stated age;attire appropriate to age and situation   Hygiene well groomed   Suicidality other (see comments)  (n/a)   Enviromental Risk Factors None   Self Injury other (see comment)  (n/a)   Elopement (n/a)   Activity withdrawn   Speech clear   Medication Sensitivity no stated side effects;no observed side effects   Psychomotor / Gait balanced;steady   Psycho Education   Type of Intervention 1:1 intervention   Response participates, initiates socially appropriate   Hours 0.5   Treatment Detail check-in    Group Therapy Session   Group Attendance attended group session   Time Session Began 1700   Time Session Ended 1715   Total Time (minutes) 15   Group Type community   Activities of Daily Living   Hygiene/Grooming independent   Oral Hygiene independent   Dress street clothes   Room Organization independent   Behavioral Health Interventions   Depression maintain safety precautions   Social and Therapeutic Interventions (Depression) encourage socialization with peers;encourage participation in therapeutic groups and milieu activities

## 2017-12-18 NOTE — PLAN OF CARE
Problem: General Plan of Care (Inpatient Behavioral)  Goal: Team Discussion  Team Plan:    BEHAVIORAL TEAM DISCUSSION    Participants: RICKY Gutierrez, ANGELICA Pacheco  Progress: recent admission, assessment continued  Continued Stay Criteria/Rationale: SIB  Medical/Physical: cuts on wrist  Precautions:   Behavioral Orders   Procedures     Code 1 - Restrict to Unit     Code 1 - Restrict to Unit     Routine Programming     As clinically indicated     Routine Programming     As clinically indicated     Status 15     Every 15 minutes.     Status 15     Every 15 minutes.     Suicide precautions     Plan: further assessment is needed, possible DBT referral, possible medication change per psychiatry, groups for coping skills  Rationale for change in precautions or plan: no changes      Problem: Patient Care Overview  Goal: Team Discussion  Team Plan:    BEHAVIORAL TEAM DISCUSSION    Participants: RICKY Gutierrez, ANGELICA Pacheco  Progress: recent admission, assessment continued  Continued Stay Criteria/Rationale: SIB  Medical/Physical: cuts on wrist  Precautions:   Behavioral Orders   Procedures     Code 1 - Restrict to Unit     Code 1 - Restrict to Unit     Routine Programming     As clinically indicated     Routine Programming     As clinically indicated     Status 15     Every 15 minutes.     Status 15     Every 15 minutes.     Suicide precautions     Plan: further assessment is needed, possible DBT referral, possible medication change per psychiatry, groups for coping skills  Rationale for change in precautions or plan: no changes

## 2017-12-19 PROCEDURE — 25000132 ZZH RX MED GY IP 250 OP 250 PS 637: Performed by: PSYCHIATRY & NEUROLOGY

## 2017-12-19 PROCEDURE — 12400007 ZZH R&B MH INTERMEDIATE UMMC

## 2017-12-19 RX ADMIN — CHOLECALCIFEROL CAP 125 MCG (5000 UNIT) 5000 UNITS: 125 CAP at 09:17

## 2017-12-19 RX ADMIN — VENLAFAXINE HYDROCHLORIDE 75 MG: 75 TABLET, EXTENDED RELEASE ORAL at 09:17

## 2017-12-19 RX ADMIN — LAMOTRIGINE 25 MG: 25 TABLET ORAL at 09:17

## 2017-12-19 RX ADMIN — NORETHINDRONE ACETATE AND ETHINYL ESTRADIOL 1 TABLET: 1.5; 3 TABLET ORAL at 09:17

## 2017-12-19 RX ADMIN — CAFFEINE 200 MG: 200 TABLET ORAL at 09:17

## 2017-12-19 ASSESSMENT — ACTIVITIES OF DAILY LIVING (ADL)
ORAL_HYGIENE: INDEPENDENT
DRESS: INDEPENDENT
GROOMING: INDEPENDENT
LAUNDRY: WITH SUPERVISION

## 2017-12-19 NOTE — PROGRESS NOTES
Wound care done on both arms.  Healing well no signs of infection.  No redness or swelling.  Cooperative with dressing change.  Appetite is good eating well for breakfast.

## 2017-12-19 NOTE — PROGRESS NOTES
Writer met with pt to discuss discharge planning. Pt is not willing to participate in DBT for a couple of reasons. She will not be able to see her current therapist while in DBT and she did not care for some of the structure of DBT. Pt was willing to discuss day treatment and writer put in a referral. Hopefully someone from the program can come to the unit to discuss the program prior to discharge on Thursday.    Maria Ines Gilliam will be up to talk with pt about day treatment at 11:00 on Wednesday.

## 2017-12-19 NOTE — PLAN OF CARE
Problem: Depressive Symptoms  Goal: Depressive Symptoms  Signs and symptoms of listed problems will be absent or manageable.   Outcome: No Change  Pt denied SI/SIB. Pt stated she feels depressed and anxious but when asked to rate how bad it is she states she usually has problem rating things. Pt denies auditory or visual hallucinations. States that it helped to have visitors this evening. Pt was visible in the milieu but didn't participate in groups. Pt paced back and forth in the hallway reading a book. Pt states she feels stagnated when asked if she feels like things are getting better. Compliant with medications.

## 2017-12-20 ENCOUNTER — BEH TREATMENT PLAN (OUTPATIENT)
Dept: BEHAVIORAL HEALTH | Facility: CLINIC | Age: 22
End: 2017-12-20
Attending: PSYCHIATRY & NEUROLOGY

## 2017-12-20 VITALS
BODY MASS INDEX: 22.29 KG/M2 | OXYGEN SATURATION: 97 % | RESPIRATION RATE: 17 BRPM | DIASTOLIC BLOOD PRESSURE: 64 MMHG | WEIGHT: 142 LBS | HEART RATE: 93 BPM | HEIGHT: 67 IN | TEMPERATURE: 99.1 F | SYSTOLIC BLOOD PRESSURE: 125 MMHG

## 2017-12-20 PROCEDURE — 25000132 ZZH RX MED GY IP 250 OP 250 PS 637: Performed by: PSYCHIATRY & NEUROLOGY

## 2017-12-20 PROCEDURE — 99239 HOSP IP/OBS DSCHRG MGMT >30: CPT | Performed by: PSYCHIATRY & NEUROLOGY

## 2017-12-20 RX ORDER — VENLAFAXINE HYDROCHLORIDE 75 MG/1
75 TABLET, EXTENDED RELEASE ORAL
Qty: 30 EACH | Refills: 1 | Status: SHIPPED | OUTPATIENT
Start: 2017-12-21 | End: 2018-01-02

## 2017-12-20 RX ORDER — LAMOTRIGINE 25 MG/1
25 TABLET ORAL DAILY
Qty: 60 TABLET | Refills: 1 | Status: SHIPPED | OUTPATIENT
Start: 2017-12-21 | End: 2019-07-02

## 2017-12-20 RX ORDER — ERGOCALCIFEROL 1.25 MG/1
50000 CAPSULE, LIQUID FILLED ORAL
Qty: 4 CAPSULE | Refills: 0 | Status: SHIPPED | OUTPATIENT
Start: 2017-12-23 | End: 2019-07-02

## 2017-12-20 RX ORDER — HYDROXYZINE HYDROCHLORIDE 25 MG/1
25-50 TABLET, FILM COATED ORAL EVERY 4 HOURS PRN
Qty: 90 TABLET | Refills: 1 | Status: SHIPPED | OUTPATIENT
Start: 2017-12-20 | End: 2019-07-02

## 2017-12-20 RX ADMIN — CAFFEINE 200 MG: 200 TABLET ORAL at 09:11

## 2017-12-20 RX ADMIN — CHOLECALCIFEROL CAP 125 MCG (5000 UNIT) 5000 UNITS: 125 CAP at 09:11

## 2017-12-20 RX ADMIN — LAMOTRIGINE 25 MG: 25 TABLET ORAL at 09:11

## 2017-12-20 RX ADMIN — VENLAFAXINE HYDROCHLORIDE 75 MG: 75 TABLET, EXTENDED RELEASE ORAL at 09:11

## 2017-12-20 RX ADMIN — NORETHINDRONE ACETATE AND ETHINYL ESTRADIOL 1 TABLET: 1.5; 3 TABLET ORAL at 09:13

## 2017-12-20 ASSESSMENT — ACTIVITIES OF DAILY LIVING (ADL)
LAUNDRY: WITH SUPERVISION
HYGIENE/GROOMING: INDEPENDENT
ORAL_HYGIENE: INDEPENDENT
DRESS: INDEPENDENT

## 2017-12-20 NOTE — PLAN OF CARE
Pt was discharged from [facility] to home.  Pt was picked up by boyfriend.  At the time of discharge, pt denies SI/SIB and reports being safe to leave the hospital.  Discharge teaching, including f/u care and medication teaching, has been completed with pt.  Pt left with all her belongings.  Pt denies SI/SIB/HI.

## 2017-12-20 NOTE — PLAN OF CARE
Problem: Depressive Symptoms  Goal: Depressive Symptoms  Signs and symptoms of listed problems will be absent or manageable.   Outcome: Improving  48 Hour Assessment    Pt visible in the milieu for the entirety of the shift. She was pleasant upon approach, but displayed a blunted affect. In addition, when checking in with writer, she appeared hesitant to answer questions regarding how she is feeling. States that the pain in her hand is improving.     SI/SIB: Pt denies. Has not made suicidal or self injurious comments or gestures.     Auditory/Visual Hallucinations: Pt denies. Does not appear responding.     No additional concerns at this time. Will continue to assess and offer support.

## 2017-12-20 NOTE — DISCHARGE INSTRUCTIONS
Behavioral Discharge Planning and Instructions      Summary:  You were admitted on 12/15/2017 due to self injurious behavior and not recalling the event.  You were treated by Dr. Adam Mojica MD and discharged on 12/21/17 from [facility] Home      Principal Diagnosis: Major Depressive Disorder, Borderline Personality Disorder      Health Care Follow-up Appointments:   [facility] [phone]  [address] [facility]  Dr. Christie Asif Wednesday December 21st @ 1:20    Therapist  Jose Huston  [phone]  [address] [place] [address]  Friday December 22nd @ 1:00      Attend all scheduled appointments with your outpatient providers. Call at least 24 hours in advance if you need to reschedule an appointment to ensure continued access to your outpatient providers.   Major Treatments, Procedures and Findings:  You were provided with: a psychiatric assessment and medication evaluation and/or management, medical treatment    Symptoms to Report: feeling more aggressive, increased confusion, losing more sleep, mood getting worse or thoughts of suicide    Early warning signs can include: increased depression or anxiety sleep disturbances increased thoughts or behaviors of suicide or self-harm  increased unusual thinking, such as paranoia or hearing voices    Safety and Wellness:  Take all medicines as directed.  Make no changes unless your doctor suggests them.      Follow treatment recommendations.  Refrain from alcohol and non-prescribed drugs.  If there is a concern for safety, call 911.    Resources:   Crisis Intervention: 590.476.9601 or 314-296-9006 (TTY: 585.391.3464).  Call anytime for help.  [employer] [place] [employer] (www.[place].org): 862.148.9092 or 362-452-6401.  Mental Health Consumer/Survivor Network of [place] (www.mhcsn.net): 341.182.7445 or 653-414-3449  [facility] Crisis (COPE) Response - Adult [phone]    The treatment team has appreciated the opportunity to work  with you.     If you have any questions or concerns our unit number is [phone].  You may be receiving a follow-up phone call within the next three days from a representative from behavioral health.
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
## 2017-12-21 ENCOUNTER — TELEPHONE (OUTPATIENT)
Dept: FAMILY MEDICINE | Facility: CLINIC | Age: 22
End: 2017-12-21

## 2017-12-21 NOTE — TELEPHONE ENCOUNTER
PThas appt in 4 hours  with Dr Asif therefore call will not be made at this time.   If pt does not show to appt she will be called therefore this encounter will not be closed at this time but RN will check later.      Yasmine Cortez RN, BSN

## 2017-12-22 NOTE — TELEPHONE ENCOUNTER
VM not set up so unable to lv a message.  Lauren Burgos RN  ED / Discharge Outreach Protocol    Patient Contact    Attempt # 1    Was call answered?  No.  Unable to leave message.

## 2017-12-27 ENCOUNTER — TELEPHONE (OUTPATIENT)
Dept: BEHAVIORAL HEALTH | Facility: CLINIC | Age: 22
End: 2017-12-27

## 2017-12-27 NOTE — TELEPHONE ENCOUNTER
Sofiyar called Agnes again this morning and spoke to her via phone. She is available to come in for an appointment for a DA on Friday, 12/22/17 at 0900 with this writer.

## 2017-12-27 NOTE — TELEPHONE ENCOUNTER
received a voicemail message from yesterday from an  who stated Agnes had called and is now interested in Day Treatment services. Per our discussion last week, she had not wanted to complete the assessment due to unknown class schedule and interest.  attempted to call her this morning at 0905. She did not answer and a message was not able to be left. Will plan to try again later today.

## 2017-12-27 NOTE — TELEPHONE ENCOUNTER
ED / Discharge Outreach Protocol    Patient Contact    Attempt # 2    Was call answered?  No.  Unable to leave message. No VM.  Dina Vera RN

## 2017-12-28 ENCOUNTER — OFFICE VISIT (OUTPATIENT)
Dept: URGENT CARE | Facility: URGENT CARE | Age: 22
End: 2017-12-28
Payer: COMMERCIAL

## 2017-12-28 VITALS
SYSTOLIC BLOOD PRESSURE: 120 MMHG | HEIGHT: 67 IN | BODY MASS INDEX: 22.29 KG/M2 | HEART RATE: 88 BPM | DIASTOLIC BLOOD PRESSURE: 82 MMHG | WEIGHT: 142 LBS | TEMPERATURE: 99 F

## 2017-12-28 DIAGNOSIS — Z48.02 ENCOUNTER FOR REMOVAL OF SUTURES: Primary | ICD-10-CM

## 2017-12-28 DIAGNOSIS — R20.2 RIGHT HAND PARESTHESIA: ICD-10-CM

## 2017-12-28 DIAGNOSIS — R29.898 RIGHT HAND WEAKNESS: ICD-10-CM

## 2017-12-28 DIAGNOSIS — S61.511S: ICD-10-CM

## 2017-12-28 PROCEDURE — 99213 OFFICE O/P EST LOW 20 MIN: CPT | Performed by: NURSE PRACTITIONER

## 2017-12-28 RX ORDER — CEPHALEXIN 500 MG/1
500 CAPSULE ORAL 2 TIMES DAILY
Qty: 20 CAPSULE | Refills: 0 | Status: SHIPPED | OUTPATIENT
Start: 2017-12-28 | End: 2018-01-02

## 2017-12-28 ASSESSMENT — ENCOUNTER SYMPTOMS
CHILLS: 0
TINGLING: 1
FOCAL WEAKNESS: 1
SENSORY CHANGE: 1
FEVER: 0

## 2017-12-28 NOTE — MR AVS SNAPSHOT
"              After Visit Summary   12/28/2017    Agnes Woods    MRN: [id number]           Patient Information     Date Of Birth          1995        Visit Information        Provider Department      12/28/2017 5:55 PM Nicolette Barry NP [facility]        Today's Diagnoses     Encounter for removal of sutures    -  1    Hand weakness        Wrist laceration, right, sequela          Care Instructions      Ulnar Nerve Palsy  The ulnar nerve travels down the arm from the shoulder to the hand. It controls movement and feeling in the wrist and hand. Damage to this nerve can cause a condition called ulnar nerve palsy.  A common cause of ulnar nerve palsy is leaning on the elbow for long periods of time. Injury to the arm or elbow, such as a fracture, can also damage the ulnar nerve.  Symptoms of ulnar nerve palsy include:    Numbness, tingling, or burning (often described as \"pins and needles\")    Pain    Weakness in the hand and fingers  The treatment depends on the cause of the nerve damage. In some cases, the problem will go away on its once pressure to the nerve is relieved. Splinting the wrist to limit movement may help with healing. If the problem is due to trauma or injury, physical therapy may be prescribed. Corticosteroids injections into the area may reduce swelling and pressure on the nerve. Surgery to repair the nerve may be needed for chronic symptoms that do not respond to simpler treatments.  Home care    Rest the wrist and arm until normal feeling and strength return.    If you were given a splint or sling, wear it as directed.    Avoid positions leaning on your elbows.    If medicines were prescribed for pain or nerve sensations, take them as directed.  Follow-up care  Follow up with your healthcare provider or as advised by our staff.  When to seek medical advice  Call your healthcare provider right away if you have any of the following:    Increasing arm " "swelling or pain    Numbness or weakness of the arm    Symptoms spread to other parts of the body    Slurred speech, confusion    Trouble speaking, walking, or seeing  Date Last Reviewed: 9/25/2015 2000-2017 [employer]. [address] [place] [address], [place] [address]. All rights reserved. This information is not intended as a substitute for professional medical care. Always follow your healthcare professional's instructions.        Suture or Staple Removal  You were seen today for a suture or staple removal. Your wound is healing as expected. The wound has healed well enough that the sutures or staples can be removed. The wound will continue to heal for the next few months.  At this time there is no sign of infection.   Home care    If you have pain, take pain medicine as advised by your healthcare provider.     Keep your wound clean and protected by covering it with a bandage for the next week or so.     Wash your hands with soap and warm water before and after caring for your wound. This helps prevent infection.    Clean the wound gently with soap and warm water daily or as directed by your child s health care provider. Do not use iodine, alcohol, or other cleansers on the wound.  Gently pat it dry. Put on a new bandage, if needed. Do not reuse bandages.    If the area gets wet, gently pat it dry with a clean cloth. Replace the wet bandage with a dry one.    Check the wound daily for signs of infection. (These are listed under \"When to seek medical advice\" below.)    You may shower and bathe as usual. Swimming is now permitted.  Follow-up care  Follow up with your healthcare provider as advised.  When to seek medical advice   Call your healthcare provider if any of the following occur:    Wound reopens or bleeds    Signs of an infection, such as:    Increasing redness or swelling around the wound    Increased warmth from the wound    Worsening pain    Red streaking lines away from the wound    Fluid " "draining from the wound    Fever of 100.4 F (38 C) or higher, or as directed by your child's healthcare provider  Date Last Reviewed: 9/27/2015 2000-2017 [employer]. [address] [place] [address]. All rights reserved. This information is not intended as a substitute for professional medical care. Always follow your healthcare professional's instructions.                Follow-ups after your visit        Additional Services     OCCUPATIONAL THERAPY REFERRAL       *This therapy referral will be filtered to a centralized scheduling office at [facility] and the patient will receive a call to schedule an appointment at a [facility] location most convenient for them. *     [facility] provides Occupational Therapy evaluation and treatment and many specialty services across the [place] system.  If requesting a specialty program, please choose from the list below.    If you have not heard from the scheduling office within 2 business days, please call [phone] for all locations, with the exception of [place], please call [phone].     Treatment: Evaluation & Treatment  Special Instructions/Modalities: possible injury to ulnar nerve  Special Programs: PT and OT status post right wrist injury  Please be aware that coverage of these services is subject to the terms and limitations of your health insurance plan.  Call member services at your health plan with any benefit or coverage questions.      **Note to Provider:  If you are referring outside of [facility] for the therapy appointment, please list the name of the location in the \"special instructions\" above, print the referral and give to the patient to schedule the appointment.                  Your next 10 appointments already scheduled     Dec 29, 2017  9:00 AM CST   Evaluation with VICKY Soares   [facility] ([facility])    " "[address] [address]   [phone]            Jan 02, 2018  4:00 PM CST   SHORT with Selina Asif MD   [facility]    [address] [facility] [address]   [phone]              Who to contact     If you have questions or need follow up information about today's clinic visit or your schedule please contact [facility] directly at [phone].  Normal or non-critical lab and imaging results will be communicated to you by [employer], letter or phone within 4 business days after the clinic has received the results. If you do not hear from us within 7 days, please contact the clinic through [employer] or phone. If you have a critical or abnormal lab result, we will notify you by phone as soon as possible.  Submit refill requests through [employer] or call your pharmacy and they will forward the refill request to us. Please allow 3 business days for your refill to be completed.          Additional Information About Your Visit        MyChart Information     [employer] gives you secure access to your electronic health record. If you see a primary care provider, you can also send messages to your care team and make appointments. If you have questions, please call your primary care clinic.  If you do not have a primary care provider, please call [phone] and they will assist you.        Care EveryWhere ID     This is your Care EveryWhere ID. This could be used by other organizations to access your [facility] medical records  [id number]        Your Vitals Were     Pulse Temperature Height Last Period Breastfeeding? BMI (Body Mass Index)    88 99  F (37.2  C) (Oral) 5' 7\" (1.702 m) 12/14/2017 No 22.24 kg/m2       Blood Pressure from Last 3 Encounters:   12/28/17 120/82   12/18/17 125/64   12/13/17 132/76    Weight from Last 3 Encounters:   12/28/17 142 lb (64.4 kg)   12/19/17 142 lb (64.4 kg)   12/13/17 147 " lb (66.7 kg)              We Performed the Following     OCCUPATIONAL THERAPY REFERRAL        Primary Care Provider Office Phone # Fax #    Selina Christie Asif [phone]       [facility] [address] [place] [address]        Equal Access to Services     NAT MCCONNELL : Hadii aad ku hadasho Soomaali, waaxda luqadaha, qaybta kaalmada adeegyada, waxay idiin hayaan adeeg kharash la'skylarn aj. So [facility] [phone].    ATENCIÓN: Si habla español, tiene a cid disposición servicios gratuitos de asistencia lingüística. Jonasame al [phone].    We comply with applicable federal civil rights laws and [place] laws. We do not discriminate on the basis of race, color, national origin, age, disability, sex, sexual orientation, or gender identity.            Thank you!     Thank you for choosing [facility]  for your care. Our goal is always to provide you with excellent care. Hearing back from our patients is one way we can continue to improve our services. Please take a few minutes to complete the written survey that you may receive in the mail after your visit with us. Thank you!             Your Updated Medication List - Protect others around you: Learn how to safely use, store and throw away your medicines at www.disposemymeds.org.          This list is accurate as of: 12/28/17  6:31 PM.  Always use your most recent med list.                   Brand Name Dispense Instructions for use Diagnosis    Caffeine 100 MG Tabs      Take 200 mg by mouth every morning        DRISDOL 97310 UNITS Caps     4 capsule    Take 50,000 Units by mouth every 7 days    Vitamin D deficiency       hydrOXYzine 25 MG tablet    ATARAX    90 tablet    Take 1-2 tablets (25-50 mg) by mouth every 4 hours as needed for anxiety    Anxiety       lamoTRIgine 25 MG tablet    LaMICtal    60 tablet    Take 1 tablet (25 mg) by mouth daily    Suicide and self-inflicted injury by cutting and  piercing instrument, initial encounter (H)       norethindrone-ethinyl estradiol 1.5-30 MG-MCG per tablet    MICROGESTIN 1.5/30    84 tablet    Take 1 tablet by mouth daily    Encounter for initial prescription of contraceptive pills       venlafaxine 75 MG Tb24 24 hr tablet    EFFEXOR-ER    30 each    Take 1 tablet (75 mg) by mouth daily (with breakfast)    Anxiety, Moderate episode of recurrent major depressive disorder (H)

## 2017-12-29 ENCOUNTER — HOSPITAL ENCOUNTER (OUTPATIENT)
Dept: BEHAVIORAL HEALTH | Facility: CLINIC | Age: 22
Discharge: HOME | End: 2017-12-29
Attending: PSYCHIATRY & NEUROLOGY | Admitting: PSYCHIATRY & NEUROLOGY
Payer: COMMERCIAL

## 2017-12-29 PROCEDURE — 90791 PSYCH DIAGNOSTIC EVALUATION: CPT

## 2017-12-29 ASSESSMENT — PAIN SCALES - GENERAL: PAINLEVEL: MODERATE PAIN (4)

## 2017-12-29 NOTE — PATIENT INSTRUCTIONS
"  Ulnar Nerve Palsy  The ulnar nerve travels down the arm from the shoulder to the hand. It controls movement and feeling in the wrist and hand. Damage to this nerve can cause a condition called ulnar nerve palsy.  A common cause of ulnar nerve palsy is leaning on the elbow for long periods of time. Injury to the arm or elbow, such as a fracture, can also damage the ulnar nerve.  Symptoms of ulnar nerve palsy include:    Numbness, tingling, or burning (often described as \"pins and needles\")    Pain    Weakness in the hand and fingers  The treatment depends on the cause of the nerve damage. In some cases, the problem will go away on its once pressure to the nerve is relieved. Splinting the wrist to limit movement may help with healing. If the problem is due to trauma or injury, physical therapy may be prescribed. Corticosteroids injections into the area may reduce swelling and pressure on the nerve. Surgery to repair the nerve may be needed for chronic symptoms that do not respond to simpler treatments.  Home care    Rest the wrist and arm until normal feeling and strength return.    If you were given a splint or sling, wear it as directed.    Avoid positions leaning on your elbows.    If medicines were prescribed for pain or nerve sensations, take them as directed.  Follow-up care  Follow up with your healthcare provider or as advised by our staff.  When to seek medical advice  Call your healthcare provider right away if you have any of the following:    Increasing arm swelling or pain    Numbness or weakness of the arm    Symptoms spread to other parts of the body    Slurred speech, confusion    Trouble speaking, walking, or seeing  Date Last Reviewed: 9/25/2015 2000-2017 [employer]. [address] [place] [address]. All rights reserved. This information is not intended as a substitute for professional medical care. Always follow your healthcare professional's instructions.        Suture " "or Staple Removal  You were seen today for a suture or staple removal. Your wound is healing as expected. The wound has healed well enough that the sutures or staples can be removed. The wound will continue to heal for the next few months.  At this time there is no sign of infection.   Home care    If you have pain, take pain medicine as advised by your healthcare provider.     Keep your wound clean and protected by covering it with a bandage for the next week or so.     Wash your hands with soap and warm water before and after caring for your wound. This helps prevent infection.    Clean the wound gently with soap and warm water daily or as directed by your child s health care provider. Do not use iodine, alcohol, or other cleansers on the wound.  Gently pat it dry. Put on a new bandage, if needed. Do not reuse bandages.    If the area gets wet, gently pat it dry with a clean cloth. Replace the wet bandage with a dry one.    Check the wound daily for signs of infection. (These are listed under \"When to seek medical advice\" below.)    You may shower and bathe as usual. Swimming is now permitted.  Follow-up care  Follow up with your healthcare provider as advised.  When to seek medical advice   Call your healthcare provider if any of the following occur:    Wound reopens or bleeds    Signs of an infection, such as:    Increasing redness or swelling around the wound    Increased warmth from the wound    Worsening pain    Red streaking lines away from the wound    Fluid draining from the wound    Fever of 100.4 F (38 C) or higher, or as directed by your child's healthcare provider  Date Last Reviewed: 9/27/2015 2000-2017 [employer]. [address] [place] [address], [place] [address]. All rights reserved. This information is not intended as a substitute for professional medical care. Always follow your healthcare professional's instructions.        "
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
## 2017-12-29 NOTE — PROGRESS NOTES
"MY COPING PLAN FOR SAFETY          Things that are most important to me & reasons for living: \"I like my friends and family\"              My relapse Warning Signs: When I feel worse/bad progressively for several days, increased isolation  I will make my environment safer by: nothing required  When in crisis, I will use the following coping skills: (relaxation/self-soothing/distraction/activity):  Walks, talk to a friend  I will use My Support System:   Personal Supports: I can ask for reminders, support, or for them to stay with me  Trusted Friend/s:  Tosha oBlton   Family Member/s : sister Charo Beckett                  Professional Supports: I can ask for med changes, emergency appointments, help  Psychiatrist: n/a   Therapist: Jose Huston       Crisis Lines I can call to discuss options and to access support:  By [facility]:    [place] ([facility] Crisis Services) [phone]   [place] (Mental Health Crisis Program) [phone]   [place]  [phone]   [place] (COPE) [phone]   [place] [phone]   [place] ([facility]  Health Crisis Line) [phone]   [place] ([place], [place], [place], [place], [facility]) [phone]   Other Crisis Lines:   Crisis Connection (Counseling) [phone]   National Suicide Prevention [phone]   Suicide Prevention [phone]      X   I will attend my Treatment Program and talk to my one of my therapists:       X   I agree that I will report any current / recent thoughts, impulses or plans of suicide,           homicide, self injurious behavior or substance use .   X   I agree that I will not act on the above symptoms, but will follow the above plan         instead.  I can also go to the Emergency Department at [facility]: [facility] [phone]                                "

## 2017-12-29 NOTE — PROGRESS NOTES
"[facility]:  Suture Removal Note  SITUATION/OBJECTIVE                                                    Agnes Woods is a 22 year old female who presents to clinic for removal of sutures.     Patient had the sutures placed on: 12/15/2017   sutures placed at Location/Facility: [facility]  Date of last tetanus vaccine: 12/16/2017   Patient has 17 sutures are seen located on the anterior right wrist.    Reports numbness and tingling in right hand with weakness and pain with some movements. Reports inability to hold and manipulate items with affected hand.    Review of Systems   Constitutional: Negative for chills, fever and malaise/fatigue.   Neurological: Positive for tingling, sensory change and focal weakness.       Right wrist laceration edges are well approximated. Mild localized erythema and tenderness. Small amount of serous drainage on old dressing. No active drainage noted.  Left Hand/Wrist Exam   Left hand/wrist exam is normal.    Range of Motion   Normal left wrist ROM    Muscle Strength   Normal left wrist strength    Right Hand/Wrist Exam   Sensation: Abnormal    Tenderness   The patient is experiencing tenderness in the localized to laceration.    Range of Motion   Wrist  Pronation:  Abnormal  Supination: Abnormal  Flexion:      70  Extension:  0    Muscle Strength   Wrist Extension:   1/5  Wrist Flexion:       3/5  :                      2/5            ASSESSMENT      /82  Pulse 88  Temp 99  F (37.2  C) (Oral)  Ht 5' 7\" (1.702 m)  Wt 142 lb (64.4 kg)  LMP 12/14/2017  Breastfeeding? No  BMI 22.24 kg/m2    1. Encounter for removal of sutures    2. Wrist laceration, right, sequela    3. Right hand paresthesia    4. Right hand weakness        PLAN                                                      1)  All sutures were easily removed today by MA. Steri strips applied.   2) Routine wound care discussed.   3) Antibiotics per orders   4) The patient has " appt with PCP early next week. Advised to keep appt.   5) Referral placed to hand rehab.    Nicolette Barry NP

## 2017-12-29 NOTE — PROGRESS NOTES
Acknowledgement of Current Treatment Plan       I have reviewed my treatment plan with my therapist / counselor on 2/28/2017. I agree with the plan as it is written in the electronic health record.    Name Signature   Agnes Woods    Name of Therapist / Counselor    Leena Fine MA, [facility]

## 2018-01-02 ENCOUNTER — OFFICE VISIT (OUTPATIENT)
Dept: FAMILY MEDICINE | Facility: CLINIC | Age: 23
End: 2018-01-02
Payer: COMMERCIAL

## 2018-01-02 ENCOUNTER — HOSPITAL ENCOUNTER (OUTPATIENT)
Dept: BEHAVIORAL HEALTH | Facility: CLINIC | Age: 23
End: 2018-01-02
Attending: PSYCHIATRY & NEUROLOGY
Payer: COMMERCIAL

## 2018-01-02 VITALS
HEART RATE: 106 BPM | RESPIRATION RATE: 16 BRPM | HEIGHT: 67 IN | BODY MASS INDEX: 22.6 KG/M2 | SYSTOLIC BLOOD PRESSURE: 116 MMHG | WEIGHT: 144 LBS | DIASTOLIC BLOOD PRESSURE: 80 MMHG | OXYGEN SATURATION: 98 % | TEMPERATURE: 98.1 F

## 2018-01-02 DIAGNOSIS — R20.2 PARESTHESIA: Primary | ICD-10-CM

## 2018-01-02 DIAGNOSIS — F33.1 MODERATE EPISODE OF RECURRENT MAJOR DEPRESSIVE DISORDER (H): ICD-10-CM

## 2018-01-02 DIAGNOSIS — F41.9 ANXIETY: ICD-10-CM

## 2018-01-02 PROBLEM — F32.9 MAJOR DEPRESSIVE DISORDER: Status: ACTIVE | Noted: 2018-01-02

## 2018-01-02 PROCEDURE — H2012 BEHAV HLTH DAY TREAT, PER HR: HCPCS

## 2018-01-02 PROCEDURE — 99213 OFFICE O/P EST LOW 20 MIN: CPT | Performed by: FAMILY MEDICINE

## 2018-01-02 PROCEDURE — 97150 GROUP THERAPEUTIC PROCEDURES: CPT | Mod: GO

## 2018-01-02 RX ORDER — VENLAFAXINE HYDROCHLORIDE 75 MG/1
75 TABLET, EXTENDED RELEASE ORAL
Qty: 30 EACH | Refills: 3 | Status: SHIPPED | OUTPATIENT
Start: 2018-01-02 | End: 2019-07-02

## 2018-01-02 ASSESSMENT — PATIENT HEALTH QUESTIONNAIRE - PHQ9: SUM OF ALL RESPONSES TO PHQ QUESTIONS 1-9: 19

## 2018-01-02 ASSESSMENT — ANXIETY QUESTIONNAIRES
1. FEELING NERVOUS, ANXIOUS, OR ON EDGE: MORE THAN HALF THE DAYS
7. FEELING AFRAID AS IF SOMETHING AWFUL MIGHT HAPPEN: MORE THAN HALF THE DAYS
3. WORRYING TOO MUCH ABOUT DIFFERENT THINGS: NOT AT ALL
IF YOU CHECKED OFF ANY PROBLEMS ON THIS QUESTIONNAIRE, HOW DIFFICULT HAVE THESE PROBLEMS MADE IT FOR YOU TO DO YOUR WORK, TAKE CARE OF THINGS AT HOME, OR GET ALONG WITH OTHER PEOPLE: VERY DIFFICULT
6. BECOMING EASILY ANNOYED OR IRRITABLE: SEVERAL DAYS
2. NOT BEING ABLE TO STOP OR CONTROL WORRYING: MORE THAN HALF THE DAYS
5. BEING SO RESTLESS THAT IT IS HARD TO SIT STILL: NOT AT ALL

## 2018-01-02 NOTE — PROGRESS NOTES
"Adult Mental Health Outpatient Group Therapy Progress Note     Client Initial Individualized Goals for Treatment: \"To be able to finish school and go to college and do my work there. To be able to keep up with my friendships and commitments to other people. To not want to die.\".    See Initial Treatment suggestions for the client during the time between Diagnostic Assessment and completion of the Master Individualized Treatment Plan.    Treatment Goals:     See above     Area of Treatment Focus:  Symptom Management, Personal Safety and Community Resources/Discharge Planning    Therapeutic Interventions/Treatment Strategies:  Support, Feedback, Safety Assessments and Cognitive Behavioral Therapy    Response to Treatment Strategies:  Accepted Feedback, Gave Feedback, Listened, Attentive, Accepted Support and Alert    Name of Group:  Group psychotherapy at 9:00 am.      Description and Outcome:  Today was Agnes's first day of group. She prefers to go by \"Socorro\". She appeared to be in a good mood and quickly became comfortable asking questions and giving feedback to group members. She is a student at [employer], going for a degree in Sociology. She reported that the New Year holiday was very hard for her this year. She reported that in [place], this is a huge celebration, so she missed her family a lot. She reported feeling \"strange and sad\" about being away from her family. Socorro also reported that she went to a get together at her friends house that ended up being a party. This made her uncomfortable because there were too many people there. She reported that she has been very sad and on the verge of tears a lot lately, but she is \"very good at pushing emotions away\". Lastly, Socorro reported that she is in a lot of physical pain today. She recently self-harmed, where she cut her wrist. This caused nerve damage, causing her to have difficulty using her hand at all. She feels a lot of shame around this act and reported " "that it was \"so stupid\". The group was very supportive of Socorro and made her feel comfortable. Client did not report any current suicidal ideation.     Client demonstrated understanding of session content by being open with the group about what brought her there and her act of self-harm. She also accepted all the feedback from the group.    Is this a Weekly Review of the Progress on the Treatment Plan?  No  "

## 2018-01-02 NOTE — PROGRESS NOTES
SUBJECTIVE:   Agnes Woods is a 22 year old female who presents to clinic today for the following health issues:     Hospital Admission 12- following self-inflicted cut to RIGHT wrist.    Concern regarding possibility of permanent damage done to hand- still with tingling and numbness of RIGHT 2nd/3rd/4th fingertips.  Normal strength and ROM of all 5 digits.  Sensation has been impaired.  Sutures were removed in  12-.  Steri strips remain intact.    Started on lamictal in hospital and venlafaxine XR was increased to 75mg once daily.  She has just started intensive outpatient therapy and presents with much brighter affect and disposition today in office.  Requests refill of the venlafaxine XR 75mg tabs she got in hospital.    Problem list and histories reviewed & adjusted, as indicated.  Additional history: as documented    Patient Active Problem List   Diagnosis     Suicidal ideation     Depression     Self-injurious behavior     Major depressive disorder     History reviewed. No pertinent surgical history.    Social History   Substance Use Topics     Smoking status: Light Tobacco Smoker     Packs/day: 0.25     Smokeless tobacco: Never Used     Alcohol use 0.0 oz/week     0 Standard drinks or equivalent per week      Comment: Drinks alcohol most days.      Family History   Problem Relation Age of Onset     Family History Negative Mother      Family History Negative Father          Current Outpatient Prescriptions   Medication Sig Dispense Refill     venlafaxine (EFFEXOR-ER) 75 MG TB24 24 hr tablet Take 1 tablet (75 mg) by mouth daily (with breakfast) 30 each 3     hydrOXYzine (ATARAX) 25 MG tablet Take 1-2 tablets (25-50 mg) by mouth every 4 hours as needed for anxiety 90 tablet 1     lamoTRIgine (LAMICTAL) 25 MG tablet Take 1 tablet (25 mg) by mouth daily 60 tablet 1     Ergocalciferol (VITAMIN D) 09499 UNITS CAPS Take 50,000 Units by mouth every 7 days 4 capsule 0     norethindrone-ethinyl  "estradiol (MICROGESTIN 1.5/30) 1.5-30 MG-MCG per tablet Take 1 tablet by mouth daily 84 tablet 1     Caffeine 100 MG TABS Take 200 mg by mouth every morning        [DISCONTINUED] venlafaxine (EFFEXOR-ER) 75 MG TB24 24 hr tablet Take 1 tablet (75 mg) by mouth daily (with breakfast) 30 each 1     No Known Allergies  BP Readings from Last 3 Encounters:   01/02/18 116/80   12/28/17 120/82   12/18/17 125/64    Wt Readings from Last 3 Encounters:   01/02/18 144 lb (65.3 kg)   12/28/17 142 lb (64.4 kg)   12/19/17 142 lb (64.4 kg)        Reviewed and updated as needed this visit by clinical staffTobacco  Allergies  Meds  Med Hx  Surg Hx  Fam Hx  Soc Hx      Reviewed and updated as needed this visit by Provider         ROS:  Constitutional, HEENT, cardiovascular, pulmonary, gi and gu systems are negative, except as otherwise noted.      OBJECTIVE:   /80 (BP Location: Left arm, Patient Position: Sitting, Cuff Size: Adult Regular)  Pulse 106  Temp 98.1  F (36.7  C) (Tympanic)  Resp 16  Ht 5' 7\" (1.702 m)  Wt 144 lb (65.3 kg)  LMP 12/14/2017  SpO2 98%  Breastfeeding? No  BMI 22.55 kg/m2  Body mass index is 22.55 kg/(m^2).     GENERAL: healthy, alert and no distress  EYES: Eyes grossly normal to inspection, PERRL and conjunctivae and sclerae normal  NECK: no adenopathy, no asymmetry, masses, or scars and thyroid normal to palpation  MS: no gross musculoskeletal defects noted, no edema, normal ROM of RIGHT wrist today with pronation and supination.  Normal strength of fingers felxed and extended.  + Paresthesias.  SKIN: Anterior RIGHT wrist vertical laceration with steri strips c/d/i- no sign of erythema or infection.  Edges well-approximated from recent sutures and healing well.  NEURO: Normal strength and tone, mentation intact and speech normal  PSYCH: mentation appears normal, brighter affect, smiling easily today    Diagnostic Test Results:  none     ASSESSMENT/PLAN:     1. Paresthesias RIGHT " HAND    Advised this is normal sequelae from the cut and will take some time for the very small nerve branches to repair themselves over the next 3-6 months.  Recommend hand therapy as desired.  She was reassured knowing this will likely improve with time and would like to defer PT for now.    2. Anxiety    - venlafaxine (EFFEXOR-ER) 75 MG TB24 24 hr tablet; Take 1 tablet (75 mg) by mouth daily (with breakfast)  Dispense: 30 each; Refill: 3    3. Moderate episode of recurrent major depressive disorder (H)    - venlafaxine (EFFEXOR-ER) 75 MG TB24 24 hr tablet; Take 1 tablet (75 mg) by mouth daily (with breakfast)  Dispense: 30 each; Refill: 3    Effexor refilled for patient today.  Continue intensive outpatient therapy.    Selina Asif MD  [facility]

## 2018-01-02 NOTE — MR AVS SNAPSHOT
After Visit Summary   1/2/2018    Agnes Woods    MRN: [id number]           Patient Information     Date Of Birth          1995        Visit Information        Provider Department      1/2/2018 4:00 PM Selina Asif MD [facility]        Today's Diagnoses     Paresthesia    -  1    Anxiety        Moderate episode of recurrent major depressive disorder (H)           Follow-ups after your visit        Follow-up notes from your care team     Return if symptoms worsen or fail to improve.      Who to contact     If you have questions or need follow up information about today's clinic visit or your schedule please contact [facility] directly at [phone].  Normal or non-critical lab and imaging results will be communicated to you by MyChart, letter or phone within 4 business days after the clinic has received the results. If you do not hear from us within 7 days, please contact the clinic through [employer] or phone. If you have a critical or abnormal lab result, we will notify you by phone as soon as possible.  Submit refill requests through [employer] or call your pharmacy and they will forward the refill request to us. Please allow 3 business days for your refill to be completed.          Additional Information About Your Visit        MyChart Information     [employer] gives you secure access to your electronic health record. If you see a primary care provider, you can also send messages to your care team and make appointments. If you have questions, please call your primary care clinic.  If you do not have a primary care provider, please call [phone] and they will assist you.        Care EveryWhere ID     This is your Care EveryWhere ID. This could be used by other organizations to access your [place] medical records  [id number]        Your Vitals Were     Pulse Temperature Respirations Height Last Period Pulse Oximetry    106 98.1  " F (36.7  C) (Tympanic) 16 5' 7\" (1.702 m) 12/14/2017 98%    Breastfeeding? BMI (Body Mass Index)                No 22.55 kg/m2           Blood Pressure from Last 3 Encounters:   01/02/18 116/80   12/28/17 120/82   12/18/17 125/64    Weight from Last 3 Encounters:   01/02/18 144 lb (65.3 kg)   12/28/17 142 lb (64.4 kg)   12/19/17 142 lb (64.4 kg)              Today, you had the following     No orders found for display         Today's Medication Changes          These changes are accurate as of: 1/2/18 11:59 PM.  If you have any questions, ask your nurse or doctor.               Stop taking these medicines if you haven't already. Please contact your care team if you have questions.     cephALEXin 500 MG capsule   Commonly known as:  KEFLEX   Stopped by:  Selina Asif MD                Where to get your medicines      These medications were sent to [facility] - [place], [place] - [address], [place] [address]     Phone:  [phone]     venlafaxine 75 MG Tb24 24 hr tablet                Primary Care Provider Office Phone # Fax #    Selina Asif [phone]       [facility] [address] [place] [address]        Equal Access to Services     YUDELKA MCCONNELL AH: Hadii aad ku hadasho Soomaali, waaxda luqadaha, qaybta kaalmada adeegyada, waxay marcein haylaura estradaarajayme la'laura ah. So [facility] [phone].    ATENCIÓN: Si habla español, tiene a cid disposición servicios gratuitos de asistencia lingüística. Llame al [phone].    We comply with applicable federal civil rights laws and [place] laws. We do not discriminate on the basis of race, color, national origin, age, disability, sex, sexual orientation, or gender identity.            Thank you!     Thank you for choosing [facility]  for your care. Our goal is always to provide you with excellent care. Hearing back from our patients is " one way we can continue to improve our services. Please take a few minutes to complete the written survey that you may receive in the mail after your visit with us. Thank you!             Your Updated Medication List - Protect others around you: Learn how to safely use, store and throw away your medicines at www.disposemymeds.org.          This list is accurate as of: 1/2/18 11:59 PM.  Always use your most recent med list.                   Brand Name Dispense Instructions for use Diagnosis    Caffeine 100 MG Tabs      Take 200 mg by mouth every morning        DRISDOL 18425 UNITS Caps     4 capsule    Take 50,000 Units by mouth every 7 days    Vitamin D deficiency       hydrOXYzine 25 MG tablet    ATARAX    90 tablet    Take 1-2 tablets (25-50 mg) by mouth every 4 hours as needed for anxiety    Anxiety       lamoTRIgine 25 MG tablet    LaMICtal    60 tablet    Take 1 tablet (25 mg) by mouth daily    Suicide and self-inflicted injury by cutting and piercing instrument, initial encounter (H)       norethindrone-ethinyl estradiol 1.5-30 MG-MCG per tablet    MICROGESTIN 1.5/30    84 tablet    Take 1 tablet by mouth daily    Encounter for initial prescription of contraceptive pills       venlafaxine 75 MG Tb24 24 hr tablet    EFFEXOR-ER    30 each    Take 1 tablet (75 mg) by mouth daily (with breakfast)    Anxiety, Moderate episode of recurrent major depressive disorder (H)

## 2018-01-03 NOTE — PROGRESS NOTES
"Adult Mental Health Outpatient Group Therapy Progress Note     Client Initial Individualized Goals for Treatment: To be able to finish school and go to college and do my work there. To be able to keep up with my friendships and commitments to other people. To not want to die.\".    See Initial Treatment suggestions for the client during the time between Diagnostic Assessment and completion of the Master Individualized Treatment Plan.    Treatment Goals:     See above     Area of Treatment Focus:  Symptom Management    Therapeutic Interventions/Treatment Strategies:  Support, Feedback, Safety Assessments, Structured Activity and Education    Response to Treatment Strategies:  Accepted Feedback, Listened, Attentive, Accepted Support and Alert    Name of Group:  OT Skills     Description and Outcome:  Client presented with calm,even mood during first session in the Day Treatment Program. She is a student at [employer]. This is her first experience with group therapy.Good focus and concentration during a discussion related to personal conflict management. She gave feedback to others but did not volunteer to speak about her own conflicts.Continue to assess.  Client would benefit from additional opportunities to practice and implement content from this session in her everyday life and mental health recovery.    Is this a Weekly Review of the Progress on the Treatment Plan?  Yes.      Are Treatment Plan Goals being addressed?  Yes, continue treatment goals      Are Treatment Plan Strategies to Address Goals Effective?  Yes, continue treatment strategies      Are there any current contracts in place?  No      "

## 2018-01-04 ENCOUNTER — HOSPITAL ENCOUNTER (OUTPATIENT)
Dept: BEHAVIORAL HEALTH | Facility: CLINIC | Age: 23
End: 2018-01-04
Attending: PSYCHIATRY & NEUROLOGY
Payer: COMMERCIAL

## 2018-01-04 PROCEDURE — H2012 BEHAV HLTH DAY TREAT, PER HR: HCPCS

## 2018-01-04 PROCEDURE — 97150 GROUP THERAPEUTIC PROCEDURES: CPT | Mod: GO
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
## 2018-01-08 ENCOUNTER — HOSPITAL ENCOUNTER (OUTPATIENT)
Dept: BEHAVIORAL HEALTH | Facility: CLINIC | Age: 23
End: 2018-01-08
Attending: PSYCHIATRY & NEUROLOGY
Payer: COMMERCIAL

## 2018-01-08 PROCEDURE — H2012 BEHAV HLTH DAY TREAT, PER HR: HCPCS

## 2018-01-08 PROCEDURE — 97150 GROUP THERAPEUTIC PROCEDURES: CPT | Mod: GO

## 2018-01-08 NOTE — PROGRESS NOTES
"Adult Mental Health Outpatient Group Therapy Progress Note     Client Initial Individualized Goals for Treatment: \"To be able to finish school and go to college and do my work there. To be able to keep up with my friendships and commitments to other people. To not want to die.\".     See Initial Treatment suggestions for the client during the time between Diagnostic Assessment and completion of the Master Individualized Treatment Plan.     Treatment Goals:      See above       Area of Treatment Focus:  Symptom Management, Personal Safety and Community Resources/Discharge Planning    Therapeutic Interventions/Treatment Strategies:  Support, Feedback, Safety Assessments, Clarification, Education and Cognitive Behavioral Therapy    Response to Treatment Strategies:  Accepted Feedback, Listened, Attentive, Accepted Support and Alert    Name of Group:  Group Psychotherapy at 9:00 am      Description and Outcome:  This was the first time for this therapist to meet the client.  She started two days ago but this writer was out ill.  She was asked if she had any questions from her first day and she said that she did not.  She did take a turn and participated.  She seemed to minimized her stressors and her recent attempt and stayed mostly superficial.  She is still very concerned about her academic process and her living arrangements.  She also mentioned about how constrained she felt in the [place] academic system.  She has not done well in her classes which of course affects her self esteem and self worth.  She reports being safe for the day and was pleasant in the group.    Client demonstrated understanding of session content by taking a turn and verbally processing in an appropriate way.  Client would benefit from additional opportunities to practice and implement content from this session.      Is this a Weekly Review of the Progress on the Treatment Plan?  Yes.      Are Treatment Plan Goals being addressed?  Yes, " continue treatment goals      Are Treatment Plan Strategies to Address Goals Effective?  Yes, continue treatment strategies      Are there any current contracts in place?  No

## 2018-01-09 ENCOUNTER — HOSPITAL ENCOUNTER (OUTPATIENT)
Dept: BEHAVIORAL HEALTH | Facility: CLINIC | Age: 23
End: 2018-01-09
Attending: PSYCHIATRY & NEUROLOGY
Payer: COMMERCIAL

## 2018-01-09 PROCEDURE — 97150 GROUP THERAPEUTIC PROCEDURES: CPT | Mod: GO

## 2018-01-09 PROCEDURE — H2012 BEHAV HLTH DAY TREAT, PER HR: HCPCS

## 2018-01-09 NOTE — PROGRESS NOTES
"Adult Mental Health Outpatient Group Therapy Progress Note     Client Initial Individualized Goals for Treatment: To be able to finish school and go to college and do my work there. To be able to keep up with my friendships and commitments to other people. To not want to die.\".    See Initial Treatment suggestions for the client during the time between Diagnostic Assessment and completion of the Master Individualized Treatment Plan.    Treatment Goals:     See above     Area of Treatment Focus:  Symptom Management    Therapeutic Interventions/Treatment Strategies:  Support, Feedback, Safety Assessments, Structured Activity and Education    Response to Treatment Strategies:  Accepted Feedback, Listened, Attentive, Accepted Support and Alert    Name of Group:   OT Skills    Description and Outcome:  Client presented with calm,even mood.Good focus and concentration during a discussion related to communication with a focus on social risk taking. Client talked about needing to reach out more to others not just when she is not doing well.  Client would benefit from additional opportunities to practice and implement content from this session in her everyday life and mental health recovery.    Is this a Weekly Review of the Progress on the Treatment Plan?  Yes.      Are Treatment Plan Goals being addressed?  Yes, continue treatment goals      Are Treatment Plan Strategies to Address Goals Effective?  Yes, continue treatment strategies      Are there any current contracts in place?  No      "

## 2018-01-09 NOTE — PROGRESS NOTES
"Adult Mental Health Outpatient Group Therapy Progress Note     Client Initial Individualized Goals for Treatment: To be able to finish school and go to college and do my work there. To be able to keep up with my friendships and commitments to other people. To not want to die.\".    See Initial Treatment suggestions for the client during the time between Diagnostic Assessment and completion of the Master Individualized Treatment Plan.    Treatment Goals:     See above     Area of Treatment Focus:  Symptom Management    Therapeutic Interventions/Treatment Strategies:  Support, Feedback, Safety Assessments, Structured Activity and Education    Response to Treatment Strategies:  Accepted Feedback, Listened, Attentive, Accepted Support and Alert    Name of Group:   OT [facility]    Description and Outcome:  Client appeared to be experiencing anticipatory anxiety and panic in relation to needing to look at he school account  and e-mail. She responded positively to coaching and coping skills to manage anxiety and was able to get some work accomplished .  Client would benefit from additional opportunities to practice and implement content from this session in her everyday life and mental health recovery.    Is this a Weekly Review of the Progress on the Treatment Plan?  Yes.      Are Treatment Plan Goals being addressed?  Yes, continue treatment goals      Are Treatment Plan Strategies to Address Goals Effective?  Yes, continue treatment strategies      Are there any current contracts in place?  No      "

## 2018-01-11 NOTE — PROGRESS NOTES
"Adult Mental Health Outpatient Group Therapy Progress Note     Client Initial Individualized Goals for Treatment: \"To be able to finish school and go to college and do my work there. To be able to keep up with my friendships and commitments to other people. To not want to die.\".    See Initial Treatment suggestions for the client during the time between Diagnostic Assessment and completion of the Master Individualized Treatment Plan.    Treatment Goals:     See above     Area of Treatment Focus:  Symptom Management, Personal Safety and Community Resources/Discharge Planning    Therapeutic Interventions/Treatment Strategies:  Support, Feedback, Safety Assessments and Cognitive Behavioral Therapy    Response to Treatment Strategies:  Accepted Feedback, Gave Feedback, Listened, Attentive, Accepted Support and Alert    Name of Group:  Group psychotherapy at 9:00 am.      Description and Outcome:  Socorro appeared to be in a low mood today. She reported that she did get some emails sent out to her school in order to figure out if she can register or not, which felt good. She also reported that she talked with her landlady, something she was very nervous about on Monday. In order to follow through on this conversation, Socorro brought her significant other with her to help her feel comfortable. She reported that her landlady asked her to move out in February, which was stressful but expected. She has a plan to move in with her significant other. Socorro also reported that her therapist wants to refer her to a trauma specialist because she isn't improving. She currently feels like everything in her life is \"unstable and difficult\", which leads to her avoiding things. Client did not report any current suicidal ideation.    Client demonstrated understanding of session content by expressing her concerns about school, moving and switching therapists to the group and accepting their feedback on how to address these issues.       Is this " a Weekly Review of the Progress on the Treatment Plan?  No

## 2018-01-11 NOTE — PROGRESS NOTES
"  Individualized Treatment Plan     Date of Plan: 1/15/18    Name: Agnes Woods MRN: [id number]    : 1995    Programs:  Day Treatment (DT)     Clinical Track (if applicable):  1A    DSM5 Diagnosis  296.33 (F33.2) Major Depressive Disorder, Recurrent Episode, Severe     Team Members Contributing to Plan:  Emily Gauthier  and Patti Fine    Client Strengths:  caring, empathetic, has a previous history of therapy, intelligent, open to learning, support of family, friends and providers, wants to learn and willing to relate to others .     Client Participation in Plan:  Attended individual treatment plan meeting on 18, 18  Agrees with plan   Received copy of treatment plan     Areas of Vulnerability:  Anti-authoritarian.   Other identified areas of vulnerability include: Suicidal Ideation  Poor impulse control  Anxiety with/without panic attacks  Depressive symptoms  Physical/medical  Trauma/Abuse/Neglect    Long-Term Goals:  Knowledge about illness and management of symptoms   Maintenance of personal safety     Abuse Prevention Plan:  Safe, therapeutic environment   Safety coping plan as needed   Education regarding illness and skill development   Coordination with care providers     Discharge Criteria:  Satisfactory progress toward treatment goals   Improvement re: identified problems and symptoms   Ability to continue recovery at next level of service   Has a discharge plan in place   Has safety/coping plan in place      Tentative Discharge Date:  3/30/18      Areas of Treatment Focus            Area of Treatment Focus:   Personal Safety  Start Date:    18    Goal:  Target Date: 18, 3/30/18 Status: Active  Client will notify staff when needing assistance to develop or implement a coping plan to manage suicidal or self injurious urges.  Client will use coping plan for safety, as needed.      Progress:   18:  Safety reported as \"ok\", has had codey SIB urges but has " gotten feedback from group about safety plan and uses distraction at home. Will continue to monitor and let staff know if safety status changes.        Treatment Strategies:   Assess / reassess level of potential for harm to self or others  Engage in safety planning when indicated  Facilitate increased self awareness  Use reality based supportive approach          Area of Treatment Focus:   Symptom Stabilization and Management  Start Date:    1/24/18    Goal:  Target Date: 2/28/18,3/30/18 Status: Active  When in OT Agnes will report progress to plan and follow through with a list of planned daily activities and tasks providing an update of progress one time weekly.      Progress:   2/28/18:   Socorro struggles with getting productive things done in OT, such as planning an/or checking emails or doing stuff for school, will continue goal.        Treatment Strategies:   Facilitate increased self awareness  Provide education regarding the benefits of planning and following through with a list of daily activiites.        Area of Treatment Focus:   Symptom Stabilization and Management  Start Date:    1/24/18    Goal:  Target Date: 2/28/18,3/30/18 Status: Active  Agnes will use time in group therapy to discuss what gives her life purpose and meaning.      Progress:   2/28/18: Socorro will continue to work on discovering what gives her life meaning and purpose.        Treatment Strategies:   Facilitate increased self awareness  Teach adaptive coping skills and communication skills  Use reality based supportive approach          Area of Treatment Focus:   Symptom Stabilization and Management  Start Date:    1/24/18    Goal:  Target Date: 2/28/18,3/30/18   Status: Active  Agnes will use time in group therapy to work on skills to build social connections and giving feedback to peers in group.      Progress:   2/28/18: Socorro still struggles with feeling like she has connections in and outside of groups, staff encouraged  Socorro to keep offering feedback and trying to connect with group members as she appears to be doing a good job in groups of that. Socorro also plans on following up with some friends as well.         Treatment Strategies:   Facilitate increased self awareness  Provide feedback about social skills  Use reality based supportive approach          Area of Treatment Focus:   Community Resources / Support and Discharge Planning  Start Date:    1/24/18    Goal:  Target Date: 2/28/18, 3/30/18   Status: Active  Agnes will set at least one weekly goal related to developing wellness related behaviors by increasing physical activity, Agnes has expressed interest in a sefl defense class and will start looking for a class near her.      Progress:   2/28/18:  Socorro decided she would prefer to join a gym, and attend any classes they offer. Will start researching gyms in the new area she moved to in [facility].        Treatment Strategies:   Facilitate increased self awareness  Use reality based supportive approach

## 2018-01-12 ENCOUNTER — THERAPY VISIT (OUTPATIENT)
Dept: OCCUPATIONAL THERAPY | Facility: CLINIC | Age: 23
End: 2018-01-12
Payer: COMMERCIAL

## 2018-01-12 DIAGNOSIS — M79.641 PAIN OF RIGHT HAND: Primary | ICD-10-CM

## 2018-01-12 DIAGNOSIS — R20.2 PARESTHESIA: ICD-10-CM

## 2018-01-12 DIAGNOSIS — M25.531 RIGHT WRIST PAIN: ICD-10-CM

## 2018-01-12 PROCEDURE — 97165 OT EVAL LOW COMPLEX 30 MIN: CPT | Mod: GO | Performed by: OCCUPATIONAL THERAPIST

## 2018-01-12 PROCEDURE — 97535 SELF CARE MNGMENT TRAINING: CPT | Mod: GO | Performed by: OCCUPATIONAL THERAPIST

## 2018-01-12 NOTE — PROGRESS NOTES
Hand Therapy Initial Evaluation    Current Date:  1/12/2018    Diagnosis:  Right  Hand nerve pain  Date of onset:  12/15/17  Date of hand therapy orders: 12/28/17  Procedure:  Stitches out on 12/28/17  Post:  4w   Referring MD: Nicolette Barry NP   Precautions:none noted    Subjective:  Agnes Woods is a 22 year old R hand dominant female. She notes that writing, typing and washing dishes is difficult.    Patient reports symptoms of pain, stiffness/loss of motion, weakness/loss of strength, numbness and tingling  of the right wrist, hand which occurred due to volar wrist laceration (self cutting). Since onset symptoms are Gradually getting better.  Special tests:  none.  Previous treatment: stitches.    General health as reported by patient is good.  Pertinent medical history includes:mental illness, depression , migraines/headaches, smoking  Medical allergies:none.  Surgical history: none.  Medication history: anti-depressants.    Occupational Profile Information:  Current occupation is student  Currently working in normal job without restrictions, just started up the semester at [employer] yesterday. Studying [employer].  Job Tasks: prolonged sitting  Prior functional level:  no limitations  Barriers include:none  Mobility: No difficulty  Transportation: public transportation  Leisure activities/hobbies: used to enjoy piano, reading  Other: Pt typically walks two dogs. She is participating in an intensive outpatient therapy program 3 hours/3 d/week at present    Functional Outcome Measure:   Please refer to flow sheet.      Objective:  Pain Level Report  VAS(0-10) 1/12/2018   At Rest: alays at least tingling   With Use: 2-3   At worst: 6       Report of Pain:  Location:  wrist, thumb, index finger and long finger  Pain Quality:  Numb, Shooting and Tingling  Frequency: constant    Pain is worst:  daytime or nighttime  Exacerbated by:  Writing, cold   Relieved by:  NSAIDs and rest, rubbing the  area  Progression:  Slowly getting better  Edema:  none of the  wrist and hand    Palpation:  []     Normal        [x]   Tender       []  Mild         [x]   Moderate []   Severe   Location: around and over healing scar    Appearance: wounds / dressings:     1/12/2018 1/12/2018      right left   Wound location Volar wrist, at and proximal to the carpal tunnel Volar mid distal forearm   Type Self inflicted / cutting. Healing deep cut Self inflicted / cutting   Sutures present Out 12/28/17 Not needed   Open or closed closed closed   Color Red/pink Red/pink   scabbing present no no   Type of dressing placed none none   sensitivity moderate minimal       Sensation: Right:Decreased Median Nerve distribution to thumb, IF and MF particularly, also the radial side of the RF. Left: normal sensation    ROM:  Right: WFL to wrist, fingers, thumb. Pt is noted to move the thumb, IF and MD slowly    Strength:  Pain-free /Pinch Test    1/12/2018   Trials R L   1   25 72     Lat Pinch  1/12/2018   Trials R L   1   8 16     3 Pt Pinch  1/12/2018   Trials R L   1   1.5 12       Assessment:  Patient presents with symptoms consistent with diagnosis of volar R wrist laceration affecting the sensory nerves to the median nerve distribution area, s/p suture closure and stitches out 12/28/17 (non surgical).      Patient's limitations or Problem List includes:  Sensory disturbance, Adherent scarring, Decreased  and Decreased pinch of the right wrist, hand, thumb, index finger and long finger which interferes with the patient's ability to perform Self Care Tasks (dressing), Work Tasks, Sleep Patterns, Recreational Activities and Household Chores as compared to previous level of function.    Rehab Potential:  Good - Return to full activity, some limitations    Patient will benefit from skilled Occupational Therapy to increase  strength, pinch strength and sensation and decrease pain and adherence of scarring to return to  previous activity level and resume normal daily tasks and to reach their rehab potential.    Barriers to Learning:  No barrier    Communication Issues:  Patient appears to be able to clearly communicate and understand verbal and written communication and follow directions correctly.    Chart Review: Brief history including review of medical and/or therapy records relating to the presenting problem and Simple history review with patient    Identified Performance Deficits: hygiene and grooming, home establishment and management, meal preparation and cleanup, shopping, sleep, school and leisure activities    Assessment of Occupational Performance:  3-5 Performance Deficits    Clinical Decision Making (Complexity): Low complexity    Treatment Explanation:  The following has been discussed with the patient:  RX ordered/plan of care  Anticipated outcomes  Possible risks and side effects    Plan:  Frequency:  2 X a month, once daily  Duration:  for 2 months    Treatment Plan:   Modalities:  US, Fluidotherapy and Paraffin  Therapeutic Exercise:  AROM, Tendon Gliding, Isometrics and Stabilization  Neuromuscular re-education:  Nerve Gliding, Coordination/Dexterity, Desensitization, Kinesthetic Training, Proprioceptive Training and Kinesiotaping  Manual Techniques:  Scar mobilization and Myofascial release  Self Care:  Self Care Tasks, Ergonomic Considerations and Work Tasks  Discharge Plan:  Achieve all LTG.  Independent in home treatment program.  Reach maximal therapeutic benefit.    Home Exercise Program:  Tendon gliding 3x/day  cica care to B scars  Scar massage gently  Education regarding healing    Next Visit:  Strengthening if appropriate  Scar massage, US?  Progress desensitization

## 2018-01-12 NOTE — MR AVS SNAPSHOT
After Visit Summary   1/12/2018    Agnes Woods    MRN: [id number]           Patient Information     Date Of Birth          1995        Visit Information        Provider Department      1/12/2018 7:30 AM Anayeli Cobb OT [facility]        Today's Diagnoses     Pain of right hand    -  1    Paresthesia        Right wrist pain           Follow-ups after your visit        Your next 10 appointments already scheduled     Ashutosh 15, 2018  9:00 AM CST   Treatment with ADULT MH DAY 5A   [facility] ([facility])    [facility] [address] [address]   [phone]            Jan 16, 2018  9:00 AM CST   Treatment with ADULT MH DAY 5A   [facility] ([facility])    [facility] [address] [address]   [phone]            Jan 18, 2018  9:00 AM CST   Treatment with ADULT MH DAY 5A   [facility] ([facility])    [facility] [address] [address]   [phone]            Jan 22, 2018  9:00 AM CST   Treatment with ADULT MH DAY 5A   [facility] ([facility])    [facility] [address] [address]   [phone]            Jan 23, 2018  9:00 AM CST   Treatment with ADULT MH DAY 5A   [facility] ([facility])    [facility] [address] [address]   [phone]            Jan 25, 2018  9:00 AM CST   Treatment with ADULT MH DAY 5A   [facility] ([facility])    [facility] [address] [address]   [phone]            Jan 29, 2018  9:00 AM CST   Treatment with ADULT MH DAY 5A   [facility] ([facility])    [facility] [address] [address]   [phone]            Jan 29, 2018 12:30 PM CST   [facility] Hand with Anayeli Cobb OT   [facility] ([facility])    [address]  [address]th [address]  [facility] [address]   [phone]            Jan 30, 2018  9:00 AM CST   Treatment with ADULT MH DAY 5A   [facility] ([facility])    [address] [place] [address]   [phone]            Feb 01, 2018  9:00 AM CST   Treatment with ADULT MH DAY 5A   [facility] ([facility])    [address] [place] [address]   [phone]              Who to contact     If you have questions or need follow up information about today's clinic visit or your schedule please contact [facility] directly at [phone].  Normal or non-critical lab and imaging results will be communicated to you by [employer], letter or phone within 4 business days after the clinic has received the results. If you do not hear from us within 7 days, please contact the clinic through Smartiot or phone. If you have a critical or abnormal lab result, we will notify you by phone as soon as possible.  Submit refill requests through [employer] or call your pharmacy and they will forward the refill request to us. Please allow 3 business days for your refill to be completed.          Additional Information About Your Visit        [employer] Information     [employer] gives you secure access to your electronic health record. If you see a primary care provider, you can also send messages to your care team and make appointments. If you have questions, please call your primary care clinic.  If you do not have a primary care provider, please call [phone] and they will assist you.        Care EveryWhere ID     This is your Care EveryWhere ID. This  could be used by other organizations to access your [facility] medical records  [id number]        Your Vitals Were     Last Period                   12/14/2017            Blood Pressure from Last 3 Encounters:   01/02/18 116/80   12/28/17 120/82   12/18/17 125/64    Weight from Last 3 Encounters:   01/02/18 65.3 kg (144 lb)   12/28/17 64.4 kg (142 lb)   12/19/17 64.4 kg (142 lb)              We Performed the Following     HC OT EVAL, LOW COMPLEXITY     SELF CARE MNGMENT TRAINING        Primary Care Provider Office Phone # Fax #    Selina Christie Asif [phone]       [facility] [address] [place] [address]        Equal Access to Services     NAT MCCONNELL : Hadii julienne lepe hadasho Soomaali, waaxda luqadaha, qaybta kaalmada adeegyada, waxsergei valadez haylaura mathur . So [facility] [phone].    ATENCIÓN: Si habla español, tiene a cid disposición servicios gratuitos de asistencia lingüística. Llame al [phone].    We comply with applicable federal civil rights laws and [place] laws. We do not discriminate on the basis of race, color, national origin, age, disability, sex, sexual orientation, or gender identity.            Thank you!     Thank you for choosing [facility]  for your care. Our goal is always to provide you with excellent care. Hearing back from our patients is one way we can continue to improve our services. Please take a few minutes to complete the written survey that you may receive in the mail after your visit with us. Thank you!             Your Updated Medication List - Protect others around you: Learn how to safely use, store and throw away your medicines at www.disposemymeds.org.          This list is accurate as of: 1/12/18 10:32 AM.  Always use your most recent med list.                   Brand Name Dispense Instructions for use Diagnosis    Caffeine 100 MG Tabs      Take 200 mg by mouth every morning        DRISDOL 28727  UNITS Caps     4 capsule    Take 50,000 Units by mouth every 7 days    Vitamin D deficiency       hydrOXYzine 25 MG tablet    ATARAX    90 tablet    Take 1-2 tablets (25-50 mg) by mouth every 4 hours as needed for anxiety    Anxiety       lamoTRIgine 25 MG tablet    LaMICtal    60 tablet    Take 1 tablet (25 mg) by mouth daily    Suicide and self-inflicted injury by cutting and piercing instrument, initial encounter (H)       norethindrone-ethinyl estradiol 1.5-30 MG-MCG per tablet    MICROGESTIN 1.5/30    84 tablet    Take 1 tablet by mouth daily    Encounter for initial prescription of contraceptive pills       venlafaxine 75 MG Tb24 24 hr tablet    EFFEXOR-ER    30 each    Take 1 tablet (75 mg) by mouth daily (with breakfast)    Anxiety, Moderate episode of recurrent major depressive disorder (H)

## 2018-01-15 ENCOUNTER — TELEPHONE (OUTPATIENT)
Dept: BEHAVIORAL HEALTH | Facility: CLINIC | Age: 23
End: 2018-01-15

## 2018-01-15 NOTE — TELEPHONE ENCOUNTER
Client left voicemail stating she would not be in programming today (1/15) but could not decipher as to why due to poor quality of voicemail recording.

## 2018-01-17 ENCOUNTER — TELEPHONE (OUTPATIENT)
Dept: BEHAVIORAL HEALTH | Facility: CLINIC | Age: 23
End: 2018-01-17

## 2018-01-19 ENCOUNTER — TELEPHONE (OUTPATIENT)
Dept: BEHAVIORAL HEALTH | Facility: CLINIC | Age: 23
End: 2018-01-19

## 2018-01-19 NOTE — TELEPHONE ENCOUNTER
Client's individual therapist left voicemail inquiring as to Agnes's attendance as he had not seen/heard from her.  Writer returned voicemail stating Client had not attended program all week but that on Monday she left a voicemail stating she was ill, on Wednesday previous group psychotherapist spoke with her and Client stated she still had a fever, and that current group psychotherapist attempted to contact Client but was unable to leave a voicemail.

## 2018-01-19 NOTE — TELEPHONE ENCOUNTER
Called Client to inquire as to absence from programming.  Received message stating Client did not have a voicemail that had been set up yet.

## 2018-01-22 ENCOUNTER — HOSPITAL ENCOUNTER (OUTPATIENT)
Dept: BEHAVIORAL HEALTH | Facility: CLINIC | Age: 23
End: 2018-01-22
Attending: PSYCHIATRY & NEUROLOGY
Payer: COMMERCIAL

## 2018-01-22 PROCEDURE — H2012 BEHAV HLTH DAY TREAT, PER HR: HCPCS

## 2018-01-22 NOTE — PROGRESS NOTES
"Adult Mental Health Outpatient Group Therapy Progress Note     Client Initial Individualized Goals for Treatment: \"To be able to finish school and go to college and do my work there. To be able to keep up with my friendships and commitments to other people. To not want to die.\".    See Initial Treatment suggestions for the client during the time between Diagnostic Assessment and completion of the Master Individualized Treatment Plan.    Treatment Goals:  Follow Safety Plan   Ask for more information, support and/or assistance as needed.  Follow up with providers/community supports as needed: Jose JACK, Therapist, Primary Care Physican  Report increases or changes in symptoms to staff.  Report any personal safety concerns to staff.   Take medications as prescribed.  Report medication changes and/or side effects to staff.  Attend and participate in groups as scheduled or notify staff if unable to do so.  Report any use of substances to staff as this may impact your symptoms and/or                      personal safety.  Notify staff if you have any other issues that need to be addressed. This may include              any current abuse / neglect / exploitation or other vulnerability.  Follow recommendations of your treatment team and discuss concerns if not in            agreement.     Area of Treatment Focus:  Symptom Management, Develop / Improve Independent Living Skills, Develop Socialization / Interpersonal Relationship Skills and Physical Health     Therapeutic Interventions/Treatment Strategies:  Support, Feedback, Problem Solving and Education    Response to Treatment Strategies:  Accepted Feedback, Gave Feedback, Listened, Focused on Goals, Attentive and Accepted Support    Name of Group:  Psychotherapy and Mental Health Management    Description and Outcome:  Hour two (Psychotherapy): Socorro reported being sick all week, sleeping a lot, and not eating much.  Acknowledged difficulty attending to physical health " while ill and connected to increased symptoms of mental health.  She noted isolation from friends and avoidance of emails.  Aided in identifying and problem solving barriers to completing these interpersonal and daily living tasks.  She reported that in the past making a structured to do list has been useful to her.  Provided skill suggestions to following through with this plan.  She appeared receptive to feedback and did not endorse safety concerns.    Client verbalized understanding of session content by stating plans to do homework and check emails with the help of a to-do list.    Hour three (mental health management): Facilitator taught and led discussion on what validation is and how to do it to self and others.  Aided in identifying barriers to validation and strategies they might use to help improve relationships through validation.  Highlighted tendency to invalidate self which leads to higher symptoms and Challenged Client to practice validating self this week. Client participated actively in identifying barriers to and strategies for self validation and validating others.    Client would benefit from additional opportunities to practice and implement content from this session by practicing self validation and validating others.    Is this a Weekly Review of the Progress on the Treatment Plan?  Yes.      Are Treatment Plan Goals being addressed?  Yes, continue treatment goals      Are Treatment Plan Strategies to Address Goals Effective?  Yes, continue treatment strategies      Are there any current contracts in place?  No

## 2018-01-24 ENCOUNTER — HOSPITAL ENCOUNTER (OUTPATIENT)
Dept: BEHAVIORAL HEALTH | Facility: CLINIC | Age: 23
End: 2018-01-24
Attending: PSYCHIATRY & NEUROLOGY
Payer: COMMERCIAL

## 2018-01-24 PROCEDURE — H2012 BEHAV HLTH DAY TREAT, PER HR: HCPCS

## 2018-01-24 PROCEDURE — 97150 GROUP THERAPEUTIC PROCEDURES: CPT | Mod: GO

## 2018-01-25 NOTE — PROGRESS NOTES
"Adult Mental Health Outpatient Group Therapy Progress Note     Client Initial Individualized Goals for Treatment: \"To be able to finish school and go to college and do my work there. To be able to keep up with my friendships and commitments to other people. To not want to die.\".    See Initial Treatment suggestions for the client during the time between Diagnostic Assessment and completion of the Master Individualized Treatment Plan.    Treatment Goals:  Follow Safety Plan   Ask for more information, support and/or assistance as needed.  Follow up with providers/community supports as needed: Jose JACK, Therapist, Primary Care Physican  Report increases or changes in symptoms to staff.  Report any personal safety concerns to staff.   Take medications as prescribed.  Report medication changes and/or side effects to staff.  Attend and participate in groups as scheduled or notify staff if unable to do so.  Report any use of substances to staff as this may impact your symptoms and/or                      personal safety.  Notify staff if you have any other issues that need to be addressed. This may include              any current abuse / neglect / exploitation or other vulnerability.  Follow recommendations of your treatment team and discuss concerns if not in            agreement.     Area of Treatment Focus:  Symptom Management, Develop / Improve Independent Living Skills, Develop Socialization / Interpersonal Relationship Skills and Physical Health     Therapeutic Interventions/Treatment Strategies:  Support, Feedback, Problem Solving and Education    Response to Treatment Strategies:  Accepted Feedback, Gave Feedback, Listened, Focused on Goals, Attentive and Accepted Support    Name of Group:  OT Clinic    Pt attended and participated in a structured occupational therapy group where intervention focused on coping through structured hands-on activities to improve function in valued roles, routines, and independent " living skills.  Writer introduced self to client and explained purpose of group.  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  Gave feedback to peer who spoke about being on academic probation due to mental health struggles.    Description and Outcome:  Client demonstrated understanding of session content by being productive with school work and engaging with peers.    Is this a Weekly Review of the Progress on the Treatment Plan?  No

## 2018-01-25 NOTE — PROGRESS NOTES
"Adult Mental Health Outpatient Group Therapy Progress Note     Client Initial Individualized Goals for Treatment: \"To be able to finish school and go to college and do my work there. To be able to keep up with my friendships and commitments to other people. To not want to die.\".    See Initial Treatment suggestions for the client during the time between Diagnostic Assessment and completion of the Master Individualized Treatment Plan.    Treatment Goals:  Follow Safety Plan   Ask for more information, support and/or assistance as needed.  Follow up with providers/community supports as needed: Jose JACK, Therapist, Primary Care Physican  Report increases or changes in symptoms to staff.  Report any personal safety concerns to staff.   Take medications as prescribed.  Report medication changes and/or side effects to staff.  Attend and participate in groups as scheduled or notify staff if unable to do so.  Report any use of substances to staff as this may impact your symptoms and/or                      personal safety.  Notify staff if you have any other issues that need to be addressed. This may include              any current abuse / neglect / exploitation or other vulnerability.  Follow recommendations of your treatment team and discuss concerns if not in            agreement.     Area of Treatment Focus:  Symptom Management, Develop / Improve Independent Living Skills, Develop Socialization / Interpersonal Relationship Skills and Physical Health     Therapeutic Interventions/Treatment Strategies:  Support, Feedback, Problem Solving     Response to Treatment Strategies:  Accepted Feedback, Gave Feedback, Listened, Focused on Goals, Attentive and Accepted Support    Name of Group:  Psychotherapy     Description and Outcome:  Socorro reported anger is elevated and notes this feels usual to her.  She reported feeling annoyed at professor and land lady and admitted drinking alcohol to cope with her feelings.  Validated and " normalized distress related to increased anger when that does not feel consistent with who you are.  Taught on how use of alcohol is problematic in coping and provided other strategies for Client to try rather to cope with anger and increased symptoms.  Suggested she try journalling, music, exercise, or talking to others to help cope with anger.  She agreed it was not helpful to drink as then she noted increase self judgments.  She reported desire to get involved with her community to add positive structure and find meaning in her life.  Reinforced plan.  She appeared receptive to feedback and did not endorse safety concerns.    Client verbalized understanding of session content by stating plans to try to listen to music to cope with anger to begin making a plan for moving as she moves this weekend.      Is this a Weekly Review of the Progress on the Treatment Plan?  No

## 2018-01-29 ENCOUNTER — THERAPY VISIT (OUTPATIENT)
Dept: OCCUPATIONAL THERAPY | Facility: CLINIC | Age: 23
End: 2018-01-29
Payer: COMMERCIAL

## 2018-01-29 ENCOUNTER — HOSPITAL ENCOUNTER (OUTPATIENT)
Dept: BEHAVIORAL HEALTH | Facility: CLINIC | Age: 23
End: 2018-01-29
Attending: PSYCHIATRY & NEUROLOGY
Payer: COMMERCIAL

## 2018-01-29 DIAGNOSIS — M25.531 RIGHT WRIST PAIN: ICD-10-CM

## 2018-01-29 DIAGNOSIS — M79.641 PAIN OF RIGHT HAND: Primary | ICD-10-CM

## 2018-01-29 DIAGNOSIS — R20.2 PARESTHESIA: ICD-10-CM

## 2018-01-29 PROCEDURE — 97150 GROUP THERAPEUTIC PROCEDURES: CPT | Mod: GO

## 2018-01-29 PROCEDURE — 97112 NEUROMUSCULAR REEDUCATION: CPT | Mod: GO | Performed by: OCCUPATIONAL THERAPIST

## 2018-01-29 PROCEDURE — H2012 BEHAV HLTH DAY TREAT, PER HR: HCPCS

## 2018-01-29 PROCEDURE — 97110 THERAPEUTIC EXERCISES: CPT | Mod: GO | Performed by: OCCUPATIONAL THERAPIST

## 2018-01-29 NOTE — PROGRESS NOTES
Discharge Note -Hand Therapy    Initial Evaluation Date: 1/12/18  Current Date: 1/29/2018  Number of Visits: 2    Diagnosis:  Right  Hand nerve pain  Date of onset:  12/15/17  Date of hand therapy orders: 12/28/17  Procedure:  Stitches out on 12/28/17  Post:  6w   Referring MD: Nicolette Barry NP   Precautions:none noted    Subjective:  Its feeling better. There is less tingling and less sensitivity        Objective:  Pain Level Report  VAS(0-10) 1/12/2018 1/29/2018     At Rest: always at least tingling 0, Just numb no tingling   With Use: 2-3 2   At worst: 6 2/10       Report of Pain:  Location:  wrist, thumb, index finger and long finger  Pain Quality:  Numb, Shooting and Tingling  Frequency: constant    Pain is worst:  daytime or nighttime  Exacerbated by:  Writing, cold   Relieved by:  NSAIDs and rest, rubbing the area  Progression:  Slowly getting better  Edema:  none of the  wrist and hand    Palpation:  []     Normal        [x]   Tender       [x]  Mild         []   Moderate []   Severe   Location: around and over healing volar scar    Appearance: wounds / dressings:     1/12/2018 1/12/2018 1/29/2018      right left R   Wound location Volar wrist, at and proximal to the carpal tunnel Volar mid distal forearm Volar wrist, at and proximal to the carpal tunnel   Type Self inflicted / cutting. Healing deep cut Self inflicted / cutting    Sutures present Out 12/28/17 Not needed    Open or closed closed closed closed   Color Red/pink Red/pink pink   scabbing present no no no   Type of dressing placed none none none   sensitivity moderate minimal mild       Sensation: Right:Decreased Median Nerve distribution to thumb, IF and MF particularly, also the radial side of the RF. Left: normal sensation    ROM:  Right: AROm is WFL to wrist, fingers, thumb    Strength:  Pain-free /Pinch Test    1/12/2018   Trials R L   1   25 72     Lat Pinch  1/12/2018   Trials R L   1   8 16     3 Pt Pinch   1/12/2018   Trials R L   1   1.5 12       Assessment:  Response to therapy has been improvement to:  ROM of Thumb:  All Planes  Fingers: All Planes  Sensitivity:  scar, median nerve and intensity is less  Appropriateness of Rx I have re-evaluated this patient and find that the nature, scope, duration and intensity of the therapy is appropriate for the medical condition of the patient.  Overall Assessment:  Patient is progressing well.  Patient is ready to be discharged from therapy and continue their home treatment program.  STG/LTG:  See goal sheet for details and updates.    Plan:  Frequency/Duration:  Discharge from Hand Therapy; continue home program.    Recommendations for Home Program     Home Exercise Program:  Tendon gliding   cica care to B scars  Scar massage gently  Gentle /pinch strengthening  desensitizing with texture stick

## 2018-01-29 NOTE — PROGRESS NOTES
"Adult Mental Health Outpatient Group Therapy Progress Note     Client Initial Individualized Goals for Treatment: To be able to finish school and go to college and do my work there. To be able to keep up with my friendships and commitments to other people. To not want to die.\".    See Initial Treatment suggestions for the client during the time between Diagnostic Assessment and completion of the Master Individualized Treatment Plan.    Treatment Goals:  1.Client will notify staff when needing assistance to develop or implement a coping plan to manage suicidal or self injurious urges.Client will use coping plan for safety, as needed.  2. When in OT Agnes will report progress to plan and follow through with a list of planned daily activities and tasks providing an update of progress one time weekly.  3. Agnes will use time in group therapy to discuss what gives her life purpose and meaning.  4. Agnes will use time in group therapy to work on skills to build social connections and giving feedback to peers in group.  5. Agnes will set at least one weekly goal related to developing wellness related behaviors by increasing physical activity, Agnes has expressed interest in a sefl defense class and will start looking for a class near her.     Area of Treatment Focus:  Symptom Management, Develop / Improve Independent Living Skills and Develop Socialization / Interpersonal Relationship Skills    Therapeutic Interventions/Treatment Strategies:  Support, Feedback, Problem Solving and Education    Response to Treatment Strategies:  Accepted Feedback, Gave Feedback, Listened, Focused on Goals, Attentive and Accepted Support    Name of Group:  Psychotherapy and mental health management     Description and Outcome:   Hour one (Psychotherapy):  Anneliese reported moving in with her boyfriend over the weekend.  She noted frustration in moving so much over the past few years especially with anticipation of moving " again in a few months to a nicer apartment.  Validated and noramalized.  Connected to feeling as though she does not have a home base.  Client reported low motivation interfering in ability to unpack but noted boxes/bags everywhere increased symptoms.  Provided skill suggestions related to doing one small task at a time or take small steps to feel more comfortable and organized.  They denied following through with plan to go dancing with a friend as the friend canceled.  Reinforced her willingness to follow through and highlighted that at least she did not cancel.  She reported feeling somewhat numb which group validated as difficult.  She appeared receptive to feedback and did not endorse safety concerns.      Client verbalized understanding of session content by stating plans to complete homework for class and feel slightly more organized in her apartment.    Hour twp (mental health management):  Facilitator taught and led discussion on strategies to help Client distract thoughts away from ruminations in a way that is effective.  Discussed memory games and strategies.  Lead group in exercise related to distracting self through repetition of list of items.  Client participated actively and effectively.  They engaged in distraction exercise appropriately.    Client would benefit from additional opportunities to practice and implement content from this session by implementing strategies and tools to help distract self from ruminations.    Is this a Weekly Review of the Progress on the Treatment Plan?  Yes.      Are Treatment Plan Goals being addressed?  Yes, continue treatment goals      Are Treatment Plan Strategies to Address Goals Effective?  Yes, continue treatment strategies      Are there any current contracts in place?  No

## 2018-01-29 NOTE — MR AVS SNAPSHOT
After Visit Summary   1/29/2018    Agnes Woods    MRN: [id number]           Patient Information     Date Of Birth          1995        Visit Information        Provider Department      1/29/2018 12:30 PM Anayeli Cobb OT [facility]        Today's Diagnoses     Pain of right hand    -  1    Right wrist pain        Paresthesia           Follow-ups after your visit        Your next 10 appointments already scheduled     Jan 31, 2018  9:00 AM CST   Return Visit with ADULT  DAY 1A   [facility] ([facility])    [facility] [address] [address]   [phone]            Feb 02, 2018  9:00 AM CST   Return Visit with ADULT  DAY 1A   [facility] ([facility])    [facility] [address] [address]   [phone]            Feb 05, 2018  9:00 AM CST   Return Visit with ADULT  DAY 1A   [facility] ([facility])    [facility] [address] [address]   [phone]            Feb 07, 2018  9:00 AM CST   Return Visit with ADULT  DAY 1A   [facility] ([facility])    [facility] [address] [address]   [phone]            Feb 09, 2018  9:00 AM CST   Return Visit with ADULT  DAY 1A   [facility] ([facility])    [facility] [address] [address]   [phone]            Feb 12, 2018  9:00 AM CST   Return Visit with ADULT  DAY 1A   [facility] ([facility])    [facility] [address] [address]   [phone]            Feb 14, 2018  9:00 AM CST   Return Visit with ADULT  DAY 1A   [facility] ([place]  [facility])    [address] [address]   [phone]            Feb 16, 2018  9:00 AM CST   Return Visit with ADULT  DAY 1A   [facility] ([facility])    [place] [address] [address]   [phone]            Feb 19, 2018  9:00 AM CST   Return Visit with ADULT  DAY 1A   [facility] ([facility])    [place] [address] [address]   [phone]            Feb 21, 2018  9:00 AM CST   Return Visit with ADULT  DAY 1A   [facility] ([facility])    [facility] [address] [address]   [phone]              Who to contact     If you have questions or need follow up information about today's clinic visit or your schedule please contact [facility] directly at [phone].  Normal or non-critical lab and imaging results will be communicated to you by M[employer], letter or phone within 4 business days after the clinic has received the results. If you do not hear from us within 7 days, please contact the clinic through [employer] or phone. If you have a critical or abnormal lab result, we will notify you by phone as soon as possible.  Submit refill requests through [employer] or call your pharmacy and they will forward the refill request to us. Please allow 3 business days for your refill to be completed.          Additional Information About Your Visit        Mopiohart Information     [employer] gives you secure access to your electronic health record. If you see a primary care provider, you can also send messages to your care team and make appointments. If you have questions, please call your primary care clinic.  If you do not have a primary care provider, please call [phone] and they will assist you.        Care EveryWhere  ID     This is your Care EveryWhere ID. This could be used by other organizations to access your [facility] medical records  [id number]         Blood Pressure from Last 3 Encounters:   01/02/18 116/80   12/28/17 120/82   12/18/17 125/64    Weight from Last 3 Encounters:   01/02/18 65.3 kg (144 lb)   12/28/17 64.4 kg (142 lb)   12/19/17 64.4 kg (142 lb)              We Performed the Following     NEUROMUSCULAR RE-EDUCATION     THERAPEUTIC EXERCISES        Primary Care Provider Office Phone # Fax #    Selina Christie Asif [phone]       [facility] [address] [place] [address]        Equal Access to Services     NAT MCCONNELL : Hadii aad ku hadasho Soomaali, waaxda luqadaha, qaybta kaalmada adeegyada, waxay idiin hayskylarn jorge rocha. So [facility] [phone].    ATENCIÓN: Si habla español, tiene a cid disposición servicios gratuitos de asistencia lingüística. [facility] [phone].    We comply with applicable federal civil rights laws and [place] laws. We do not discriminate on the basis of race, color, national origin, age, disability, sex, sexual orientation, or gender identity.            Thank you!     Thank you for choosing [facility]  for your care. Our goal is always to provide you with excellent care. Hearing back from our patients is one way we can continue to improve our services. Please take a few minutes to complete the written survey that you may receive in the mail after your visit with us. Thank you!             Your Updated Medication List - Protect others around you: Learn how to safely use, store and throw away your medicines at www.disposemymeds.org.          This list is accurate as of 1/29/18  4:00 PM.  Always use your most recent med list.                   Brand Name Dispense Instructions for use Diagnosis    Caffeine 100 MG Tabs      Take 200 mg by mouth every morning        DRISDOL 93047 UNITS Caps     4 capsule    Take  50,000 Units by mouth every 7 days    Vitamin D deficiency       hydrOXYzine 25 MG tablet    ATARAX    90 tablet    Take 1-2 tablets (25-50 mg) by mouth every 4 hours as needed for anxiety    Anxiety       lamoTRIgine 25 MG tablet    LaMICtal    60 tablet    Take 1 tablet (25 mg) by mouth daily    Suicide and self-inflicted injury by cutting and piercing instrument, initial encounter (H)       norethindrone-ethinyl estradiol 1.5-30 MG-MCG per tablet    MICROGESTIN 1.5/30    84 tablet    Take 1 tablet by mouth daily    Encounter for initial prescription of contraceptive pills       venlafaxine 75 MG Tb24 24 hr tablet    EFFEXOR-ER    30 each    Take 1 tablet (75 mg) by mouth daily (with breakfast)    Anxiety, Moderate episode of recurrent major depressive disorder (H)

## 2018-01-31 ENCOUNTER — HOSPITAL ENCOUNTER (OUTPATIENT)
Dept: BEHAVIORAL HEALTH | Facility: CLINIC | Age: 23
End: 2018-01-31
Attending: PSYCHIATRY & NEUROLOGY
Payer: COMMERCIAL

## 2018-01-31 PROCEDURE — H2012 BEHAV HLTH DAY TREAT, PER HR: HCPCS

## 2018-01-31 PROCEDURE — 97150 GROUP THERAPEUTIC PROCEDURES: CPT | Mod: GO

## 2018-01-31 NOTE — PROGRESS NOTES
"Adult Mental Health Outpatient Group Therapy Progress Note     Client Initial Individualized Goals for Treatment: To be able to finish school and go to college and do my work there. To be able to keep up with my friendships and commitments to other people. To not want to die.\".    See Initial Treatment suggestions for the client during the time between Diagnostic Assessment and completion of the Master Individualized Treatment Plan.    Treatment Goals:  1.Client will notify staff when needing assistance to develop or implement a coping plan to manage suicidal or self injurious urges.Client will use coping plan for safety, as needed.  2. When in OT Agnes will report progress to plan and follow through with a list of planned daily activities and tasks providing an update of progress one time weekly.  3. Agnes will use time in group therapy to discuss what gives her life purpose and meaning.  4. Agnes will use time in group therapy to work on skills to build social connections and giving feedback to peers in group.  5. Agnes will set at least one weekly goal related to developing wellness related behaviors by increasing physical activity, Agnes has expressed interest in a sefl defense class and will start looking for a class near her.     Area of Treatment Focus:  Symptom Management, Develop / Improve Independent Living Skills and Develop Socialization / Interpersonal Relationship Skills    Therapeutic Interventions/Treatment Strategies:  Support, Feedback, Problem Solving    Response to Treatment Strategies:  Accepted Feedback, Gave Feedback, Listened, Focused on Goals, Attentive and Accepted Support    Name of Group:  Psychotherapy     Description and Outcome:  Socorro reported being very tired and feeling like her brain was in a fog.  She noted difficulty concentrating.  Connected to symptoms of increased depression.  She reported she has not been introspecting for months and feels it could be a " defense mechanism but would like to start being more introspective.  Encouraged her to identify why lack of introspection is protecting her from and make sure she has skills in place to cope with distress should it arise once she introspects.  She reports reminding herself she has gotten through difficult times in the past and that this helps her in the moment.  Reinforced skill use.  She reported buying towels and studying a little to help stay on track with organizing apartment and school but feels it is not enough.  Provided skill suggestions to help her stay on track.  She appeared receptive and denied safety concerns.    Client verbalized understanding of session content by stating plans to go to dinner with her friends to help pull herself out of fog and continue to attend to schoolwork.    Is this a Weekly Review of the Progress on the Treatment Plan?  No

## 2018-02-01 NOTE — PROGRESS NOTES
"Adult Mental Health Outpatient Group Therapy Progress Note     Client Initial Individualized Goals for Treatment: To be able to finish school and go to college and do my work there. To be able to keep up with my friendships and commitments to other people. To not want to die.\".     See Initial Treatment suggestions for the client during the time between Diagnostic Assessment and completion of the Master Individualized Treatment Plan.     Treatment Goals:      See above     Area of Treatment Focus:  Symptom Management, Personal Safety and Develop / Improve Independent Living Skills    Therapeutic Interventions/Treatment Strategies:  Support, Feedback, Safety Assessments, Structured Activity and Clarification    Response to Treatment Strategies:  Accepted Feedback, Gave Feedback, Listened, Focused on Goals, Attentive, Accepted Support and Alert    Name of Group:  OT Clinic     Description and Outcome:  Dana attended and participated in a structured occupational therapy group where intervention focused on coping through structured hands-on activities.  Dana worked in a self directed, goal oriented manner on homework related to her class.  Reported she was able to get some work done and she felt good about this.  She was quiet during group and minimally interacted with peers.  Client demonstrated understanding of session content by worked on a goal directed task while managing her mental health symptoms.      Is this a Weekly Review of the Progress on the Treatment Plan?  No  "

## 2018-02-01 NOTE — PROGRESS NOTES
"Adult Mental Health Outpatient Group Therapy Progress Note     Client Initial Individualized Goals for Treatment: To be able to finish school and go to college and do my work there. To be able to keep up with my friendships and commitments to other people. To not want to die.\".    See Initial Treatment suggestions for the client during the time between Diagnostic Assessment and completion of the Master Individualized Treatment Plan.    Treatment Goals:     See above     Area of Treatment Focus:  Symptom Management    Therapeutic Interventions/Treatment Strategies:  Support, Feedback, Safety Assessments, Structured Activity and Education    Response to Treatment Strategies:  Accepted Feedback, Listened, Attentive, Accepted Support and Alert    Name of Group:   OT Skills    Description and Outcome:  Pt attended and participated in a structured occupational therapy group where intervention focused on coping through structured hands-on activities to improve function in valued roles, routines, and independent living skills.    Able to engage in leisure exploration during group.  Spent the first 15 minutes leisure reading a book that she described as a \"critical history bipolar\".  Showed interest in a detailed mandala rock painting activity, and asked for assistance in getting started.  At the end of group, reflected that she found this relaxing and enjoyable.    Client demonstrated understanding of session content by engaging in new leisure activity.      Is this a Weekly Review of the Progress on the Treatment Plan?  No  "

## 2018-02-02 ENCOUNTER — HOSPITAL ENCOUNTER (OUTPATIENT)
Dept: BEHAVIORAL HEALTH | Facility: CLINIC | Age: 23
End: 2018-02-02
Attending: PSYCHIATRY & NEUROLOGY
Payer: COMMERCIAL

## 2018-02-02 PROCEDURE — H2012 BEHAV HLTH DAY TREAT, PER HR: HCPCS

## 2018-02-02 PROCEDURE — 97150 GROUP THERAPEUTIC PROCEDURES: CPT | Mod: GO

## 2018-02-02 NOTE — PROGRESS NOTES
"Adult Mental Health Outpatient Group Therapy Progress Note     Client Initial Individualized Goals for Treatment: To be able to finish school and go to college and do my work there. To be able to keep up with my friendships and commitments to other people. To not want to die.\".    See Initial Treatment suggestions for the client during the time between Diagnostic Assessment and completion of the Master Individualized Treatment Plan.    Treatment Goals:     See above     Area of Treatment Focus:  Symptom Management    Therapeutic Interventions/Treatment Strategies:  Support, Feedback, Safety Assessments, Structured Activity and Education    Response to Treatment Strategies:  Accepted Feedback, Listened, Attentive, Accepted Support and Alert    Name of Group:   OT Skills    Description and Outcome:  Pt attended and participated in a structured occupational therapy group where intervention focused on coping through structured hands-on activities to improve function in valued roles, routines, and independent living skills.    Continues to engage in and seems interested in relaxation rock painting during OT clinic - sitting at table with peers instead of being more isolated at computer with her back to the group.  Comments she moved, slowly unpacking, but not getting too settled, as she's moving again soon.  When asked how this felt, stated \"it's fine\" and seemed indifferent.    Client demonstrated understanding of session content by engaging in new leisure activity.      Is this a Weekly Reivewof the Progress on the Treatment Plan?  Yes    Are Treatment Plan Goals being addressed?  Yes, continue treatment goals    Are Treatment Plan Strategies to Address Goals Effective?  Yes, continue treatment strategies    Are there any current contracts in place?  No      "

## 2018-02-02 NOTE — PROGRESS NOTES
"Adult Mental Health Outpatient Group Therapy Progress Note     Client Initial Individualized Goals for Treatment: To be able to finish school and go to college and do my work there. To be able to keep up with my friendships and commitments to other people. To not want to die.\".    See Initial Treatment suggestions for the client during the time between Diagnostic Assessment and completion of the Master Individualized Treatment Plan.    Treatment Goals:  1.Client will notify staff when needing assistance to develop or implement a coping plan to manage suicidal or self injurious urges.Client will use coping plan for safety, as needed.  2. When in OT Agnes will report progress to plan and follow through with a list of planned daily activities and tasks providing an update of progress one time weekly.  3. Agnes will use time in group therapy to discuss what gives her life purpose and meaning.  4. Agnes will use time in group therapy to work on skills to build social connections and giving feedback to peers in group.  5. Agnes will set at least one weekly goal related to developing wellness related behaviors by increasing physical activity, Agnes has expressed interest in a sefl defense class and will start looking for a class near her.     Area of Treatment Focus:  Symptom Management, Develop / Improve Independent Living Skills and Develop Socialization / Interpersonal Relationship Skills    Therapeutic Interventions/Treatment Strategies:  Support, Feedback, Problem Solving    Response to Treatment Strategies:  Accepted Feedback, Gave Feedback, Listened, Focused on Goals, Attentive and Accepted Support    Name of Group:  Psychotherapy     Description and Outcome:  Socorro reported feeling sad about the state of the world and big issues she cannot control.  Validated and normalized.  Encouraged her to focus on things that are within her control to help her feel safe, comfortable, or in control.  She " reported anxiety related to increase presence of police officers in the cities and stated concern as to why there needed to be police in the hospital here.  She was observed to have looked over her shoulder and around the room and appeared very anxious.  She was receptive to feedback related to focusing on what is within her control and did not report safety concerns.    Client verbalized understanding of session content by stating plans to focus on organizing her apartment and use music to cope with stress.    Is this a Weekly Review of the Progress on the Treatment Plan?  No

## 2018-02-05 ENCOUNTER — TELEPHONE (OUTPATIENT)
Dept: BEHAVIORAL HEALTH | Facility: CLINIC | Age: 23
End: 2018-02-05

## 2018-02-05 NOTE — TELEPHONE ENCOUNTER
Called Client who reported not feeling well physically or mentally.  She denied safety concerns and reported ability to stay safe until returning to programming.

## 2018-02-07 ENCOUNTER — HOSPITAL ENCOUNTER (OUTPATIENT)
Dept: BEHAVIORAL HEALTH | Facility: CLINIC | Age: 23
End: 2018-02-07
Attending: PSYCHIATRY & NEUROLOGY
Payer: COMMERCIAL

## 2018-02-07 PROCEDURE — 97150 GROUP THERAPEUTIC PROCEDURES: CPT | Mod: GO

## 2018-02-07 PROCEDURE — H2012 BEHAV HLTH DAY TREAT, PER HR: HCPCS

## 2018-02-07 NOTE — PROGRESS NOTES
"Adult Mental Health Outpatient Group Therapy Progress Note     Client Initial Individualized Goals for Treatment: To be able to finish school and go to college and do my work there. To be able to keep up with my friendships and commitments to other people. To not want to die.\".    See Initial Treatment suggestions for the client during the time between Diagnostic Assessment and completion of the Master Individualized Treatment Plan.    Treatment Goals:     See above     Area of Treatment Focus:  Symptom Management    Therapeutic Interventions/Treatment Strategies:  Support, Feedback, Safety Assessments, Structured Activity and Education    Response to Treatment Strategies:  Accepted Feedback, Listened, Attentive, Accepted Support and Alert    Name of Group:   OT Skills    Description and Outcome:  Pt attended and participated in a structured occupational therapy group where intervention focused on coping through structured hands-on activities to improve function in valued roles, routines, and independent living skills.    Arrived to group a bit late, though given positive feedback on making it here.  Wandered around looking through cabinets to find something of interest to work on, eventually chose to work on [employer].  Engaged in some discussion about school/needing to complete her literature review and submit research proposal next week.  Didn't seem particularly stressed by this.  Didn't bring up any specific concerns or stressors.    Client demonstrated understanding of session content by engaging in leisure activity.      Is this a Weekly Reivewof the Progress on the Treatment Plan?  No  "

## 2018-02-07 NOTE — PROGRESS NOTES
"Adult Mental Health Outpatient Group Therapy Progress Note     Client Initial Individualized Goals for Treatment: To be able to finish school and go to college and do my work there. To be able to keep up with my friendships and commitments to other people. To not want to die.\".    See Initial Treatment suggestions for the client during the time between Diagnostic Assessment and completion of the Master Individualized Treatment Plan.    Treatment Goals:  1.Client will notify staff when needing assistance to develop or implement a coping plan to manage suicidal or self injurious urges.Client will use coping plan for safety, as needed.  2. When in OT Agnes will report progress to plan and follow through with a list of planned daily activities and tasks providing an update of progress one time weekly.  3. Agnes will use time in group therapy to discuss what gives her life purpose and meaning.  4. Agnes will use time in group therapy to work on skills to build social connections and giving feedback to peers in group.  5. Agnes will set at least one weekly goal related to developing wellness related behaviors by increasing physical activity, Agnes has expressed interest in a sefl defense class and will start looking for a class near her.     Area of Treatment Focus:  Symptom Management, Develop / Improve Independent Living Skills, Develop Socialization / Interpersonal Relationship Skills and Physical Health     Therapeutic Interventions/Treatment Strategies:  Support, Feedback and Problem Solving    Response to Treatment Strategies:  Accepted Feedback, Gave Feedback, Listened, Focused on Goals, Attentive and Accepted Support    Name of Group:  Psychotherapy     Description and Outcome:  Socorro reported she has not followed through with plan to organize her apartment due to feeling to tired and having a lot of stuff on her mind.  Aided in identifying and problem solving barriers to organizing her " apartment.  Taught on how an organized apartment might help her feel less stressed day to day.  She reported increased anxiety, depression, and nightmares.  Validated distress.  Provided skill suggestions for coping with increase in symptoms.  Challenged her to reach out to support network to express what is going on.  She appeared receptive to feedback and did not endorse safety concerns.    Client verbalized understanding of session content by stating plans to tell at least one person about her symptoms to get support.    Is this a Weekly Review of the Progress on the Treatment Plan?  Yes.      Are Treatment Plan Goals being addressed?  Yes, continue treatment goals      Are Treatment Plan Strategies to Address Goals Effective?  Yes, continue treatment strategies      Are there any current contracts in place?  No

## 2018-02-09 ENCOUNTER — HOSPITAL ENCOUNTER (OUTPATIENT)
Dept: BEHAVIORAL HEALTH | Facility: CLINIC | Age: 23
End: 2018-02-09
Attending: PSYCHIATRY & NEUROLOGY
Payer: COMMERCIAL

## 2018-02-09 PROCEDURE — H2012 BEHAV HLTH DAY TREAT, PER HR: HCPCS

## 2018-02-09 PROCEDURE — 97150 GROUP THERAPEUTIC PROCEDURES: CPT | Mod: GO

## 2018-02-09 NOTE — PROGRESS NOTES
Psychiatry staffing: case discussed  Diagnosis:  Bipolar I, depressed recently.  Connecting with group.      Current Outpatient Prescriptions   Medication     venlafaxine (EFFEXOR-ER) 75 MG TB24 24 hr tablet     hydrOXYzine (ATARAX) 25 MG tablet     lamoTRIgine (LAMICTAL) 25 MG tablet     Ergocalciferol (VITAMIN D) 08617 UNITS CAPS     norethindrone-ethinyl estradiol (MICROGESTIN 1.5/30) 1.5-30 MG-MCG per tablet     Caffeine 100 MG TABS     No current facility-administered medications for this encounter.      Past Medical History:   Diagnosis Date     Anxiety      Depressive disorder

## 2018-02-09 NOTE — PROGRESS NOTES
"Adult Mental Health Outpatient Group Therapy Progress Note     Client Initial Individualized Goals for Treatment: To be able to finish school and go to college and do my work there. To be able to keep up with my friendships and commitments to other people. To not want to die.\".    See Initial Treatment suggestions for the client during the time between Diagnostic Assessment and completion of the Master Individualized Treatment Plan.    Treatment Goals:  1.Client will notify staff when needing assistance to develop or implement a coping plan to manage suicidal or self injurious urges.Client will use coping plan for safety, as needed.  2. When in OT Agnes will report progress to plan and follow through with a list of planned daily activities and tasks providing an update of progress one time weekly.  3. Agnes will use time in group therapy to discuss what gives her life purpose and meaning.  4. Agnes will use time in group therapy to work on skills to build social connections and giving feedback to peers in group.  5. Agnes will set at least one weekly goal related to developing wellness related behaviors by increasing physical activity, Agnes has expressed interest in a sefl defense class and will start looking for a class near her.     Area of Treatment Focus:  Symptom Management, Develop / Improve Independent Living Skills, Develop Socialization / Interpersonal Relationship Skills and Physical Health     Therapeutic Interventions/Treatment Strategies:  Support, Feedback and Problem Solving    Response to Treatment Strategies:  Accepted Feedback, Gave Feedback, Listened, Focused on Goals, Attentive and Accepted Support    Name of Group:  Psychotherapy     Description and Outcome:  Socorro reported shivering when she experiences intrusive thoughts which lead her to feeling embarrassed.  Validated and normalized.  Encouraged her to be mindful that others are not judging her.  Provided skill " suggestions to aid in coping with intrusive thoughts.  She reported waking up and instead of feeling apathetic she felt panic and anxious.  She noted this was preferred over lack of feelings.  Connected this to increased feelings of control to apply skills to cope with difficult feelings like anxiety.  Suggested connecting with music as this is something she has found useful in the past.  Encouraged her to try to identify intrusive thoughts and let them go rather than judging or holding on to them.  She appeared receptive and did not endorse safety concerns.    Client verbalized understanding of session content by stating plans to connect with music to cope with distress.    Is this a Weekly Review of the Progress on the Treatment Plan?  No

## 2018-02-12 ENCOUNTER — HOSPITAL ENCOUNTER (OUTPATIENT)
Dept: BEHAVIORAL HEALTH | Facility: CLINIC | Age: 23
End: 2018-02-12
Attending: PSYCHIATRY & NEUROLOGY
Payer: COMMERCIAL

## 2018-02-12 PROCEDURE — H2012 BEHAV HLTH DAY TREAT, PER HR: HCPCS

## 2018-02-12 PROCEDURE — 97150 GROUP THERAPEUTIC PROCEDURES: CPT | Mod: GO

## 2018-02-12 NOTE — PROGRESS NOTES
"Adult Mental Health Outpatient Group Therapy Progress Note     Client Initial Individualized Goals for Treatment: To be able to finish school and go to college and do my work there. To be able to keep up with my friendships and commitments to other people. To not want to die.\".    See Initial Treatment suggestions for the client during the time between Diagnostic Assessment and completion of the Master Individualized Treatment Plan.    Treatment Goals:     See above     Area of Treatment Focus:  Symptom Management    Therapeutic Interventions/Treatment Strategies:  Support, Feedback, Safety Assessments, Structured Activity and Education    Response to Treatment Strategies:  Accepted Feedback, Listened, Attentive, Accepted Support and Alert    Name of Group:   OT Skills    Description and Outcome:  Pt attended and participated in a structured occupational therapy group where intervention focused on coping through structured hands-on activities to improve function in valued roles, routines, and independent living skills.    Pt continues to be engaged in group, beginning to initiate offering more information about her mental health.  Shared she missed class earlier in the week as she was feeling anxious, then felt she was running too late to even go, so stayed home, and as a result felt more panicked.  When two other peers acknowledged the same thing happened to them this week, she seemed surprised, though also comforted that she was not alone in experiencing such anxiety.  Reports upcoming meeting with advisor about a research proposal, and seems to be looking forward to this part of her education/program.  Worked on rock painting during group while socializing.    Client demonstrated understanding of session content by engaging in leisure activity and utilizing the group for support.      Is this a Weekly Reivewof the Progress on the Treatment Plan?  Yes    Are Treatment Plan Goals being addressed?  Yes, continue " treatment goals    Are Treatment Plan Strategies to Address Goals Effective?  Yes, continue treatment strategies    Are there any current contracts in place?  No

## 2018-02-12 NOTE — PROGRESS NOTES
"Adult Mental Health Outpatient Group Therapy Progress Note     Client Initial Individualized Goals for Treatment: To be able to finish school and go to college and do my work there. To be able to keep up with my friendships and commitments to other people. To not want to die.\".    See Initial Treatment suggestions for the client during the time between Diagnostic Assessment and completion of the Master Individualized Treatment Plan.    Treatment Goals:     See above     Area of Treatment Focus:  Symptom Management    Therapeutic Interventions/Treatment Strategies:  Support, Feedback, Safety Assessments, Structured Activity and Education    Response to Treatment Strategies:  Accepted Feedback, Listened, Attentive, Accepted Support and Alert    Name of Group:   OT Skills    Description and Outcome:  Pt attended and participated in a structured occupational therapy group where intervention focused on coping through structured hands-on activities to improve function in valued roles, routines, and independent living skills.    Pt participated in OT group, pleasant interactions with peers.  Continues to benefit from being part of the group - at one point remembered a peer had a birthday coming up, and asked who it was.  Pt reported having a difficult weekend, didn't attend to any school work, and acknowledged drinking.  Given positive feedback for being honest, and was encouraged to process reasons for her chemical use in psychotherapy.  Chose to work on a collage today, showed writer a big blister on her finger, explained due to her wrist injury, she has no sensation and burned herself.    Client demonstrated understanding of session content by engaging in leisure activity and utilizing the group for support.      Is this a Weekly Reivewof the Progress on the Treatment Plan?  No  "

## 2018-02-12 NOTE — PROGRESS NOTES
"Adult Mental Health Outpatient Group Therapy Progress Note     Client Initial Individualized Goals for Treatment: To be able to finish school and go to college and do my work there. To be able to keep up with my friendships and commitments to other people. To not want to die.\".    See Initial Treatment suggestions for the client during the time between Diagnostic Assessment and completion of the Master Individualized Treatment Plan.    Treatment Goals:  1.Client will notify staff when needing assistance to develop or implement a coping plan to manage suicidal or self injurious urges.Client will use coping plan for safety, as needed.  2. When in OT Agnes will report progress to plan and follow through with a list of planned daily activities and tasks providing an update of progress one time weekly.  3. Agnes will use time in group therapy to discuss what gives her life purpose and meaning.  4. Agnes will use time in group therapy to work on skills to build social connections and giving feedback to peers in group.  5. Agnes will set at least one weekly goal related to developing wellness related behaviors by increasing physical activity, Agnes has expressed interest in a sefl defense class and will start looking for a class near her.     Area of Treatment Focus:  Symptom Management, Personal Safety, Abstinence/Relapse Prevention and Develop Socialization / Interpersonal Relationship Skills    Therapeutic Interventions/Treatment Strategies:  Support, Feedback, Safety Assessments, Problem Solving and Education    Response to Treatment Strategies:  Accepted Feedback, Gave Feedback, Listened, Focused on Goals, Attentive and Accepted Support    Name of Group:  Mental Health Management and Psychotherapy     Description and Outcome:  Hour two (mental health management): Facilitator taught and led discussion on what self harm is and what need it meets.  Validated purposes for acting and provided a variety " "of skills to try prior to and instead of self harming.  Completed an example of a pros and cons list related to self acting as skill Client's might try to help reduce or delay actions.  Client participated actively and effectively in covnersation    Client would benefit from additional opportunities to practice and implement content from this session by engaging in effective coping mechanisms prior to acting on self injury.    Hour three (psychotherapy): Socorro reported missing appointments and instead of \"dealing with it\" she got overwhelmed and avoided issues.  She reported drinking to the point of passing out.  Reflected on use of alcohol as an ineffective coping mechanism for stress.  Aided in identifying other things she could do in the future to cope with distress rather than drink.   She reported attending live music with friends and feels she would have had better conversations and enjoyed the music more if she had not drank so much. Highlighted negative consequence of drinking and taught on potential impact on medications/symptoms.  Socorro reported missing friends but finding it hard to reach out.  Validated and normalized.  Challenged her to reach out to someone she misses over the next few days or follow up with someone she has already begun to reach out to.  She appeared receptive to feedback and reported no safety concerns.    Client verbalized understanding of session content by stating plans to reach out to someone to increase social connection.    Is this a Weekly Review of the Progress on the Treatment Plan?  Yes.      Are Treatment Plan Goals being addressed?  Yes, continue treatment goals      Are Treatment Plan Strategies to Address Goals Effective?  Yes, continue treatment strategies      Are there any current contracts in place?  No        "

## 2018-02-14 ENCOUNTER — HOSPITAL ENCOUNTER (OUTPATIENT)
Dept: BEHAVIORAL HEALTH | Facility: CLINIC | Age: 23
End: 2018-02-14
Attending: PSYCHIATRY & NEUROLOGY
Payer: COMMERCIAL

## 2018-02-14 PROCEDURE — H2012 BEHAV HLTH DAY TREAT, PER HR: HCPCS

## 2018-02-14 PROCEDURE — 97150 GROUP THERAPEUTIC PROCEDURES: CPT | Mod: GO

## 2018-02-14 NOTE — PROGRESS NOTES
"Adult Mental Health Outpatient Group Therapy Progress Note     Client Initial Individualized Goals for Treatment: To be able to finish school and go to college and do my work there. To be able to keep up with my friendships and commitments to other people. To not want to die.\".    See Initial Treatment suggestions for the client during the time between Diagnostic Assessment and completion of the Master Individualized Treatment Plan.    Treatment Goals:  1.Client will notify staff when needing assistance to develop or implement a coping plan to manage suicidal or self injurious urges.Client will use coping plan for safety, as needed.  2. When in OT Agnes will report progress to plan and follow through with a list of planned daily activities and tasks providing an update of progress one time weekly.  3. Agnes will use time in group therapy to discuss what gives her life purpose and meaning.  4. Agnes will use time in group therapy to work on skills to build social connections and giving feedback to peers in group.  5. Agnes will set at least one weekly goal related to developing wellness related behaviors by increasing physical activity, Agnes has expressed interest in a sefl defense class and will start looking for a class near her.     Area of Treatment Focus:  Symptom Management, Personal Safety, Abstinence/Relapse Prevention and Develop Socialization / Interpersonal Relationship Skills    Therapeutic Interventions/Treatment Strategies:  Support, Feedback, Safety Assessments, Problem Solving    Response to Treatment Strategies:  Accepted Feedback, Gave Feedback, Listened, Focused on Goals, Attentive and Accepted Support    Name of Group:  Psychotherapy     Description and Outcome:  Socorro reported she was no longer feeling numb as anxiety is very high (largely related to school).  She noted physical symptoms including feeling like she was about to vomit and \"twitching\" related to intrusive " thoughts.  These were visible throughout group.  She reported school is terrifying and only marijuana has been helpful. Provided skill suggestions to try rather than using marijuana and taught on how substance can impact medications and symptoms.  Highlighted calm feelings while in a book store and encouraged her to try to go to the book store more.  Reminded her of other skills she has found helpful in the past including music and square breathing.  She appeared very receptive and stated she has also found various grounding exercises to be helpful in the past.  She did not report safety concerns.    Client verbalized understanding of session content by stating plans to cope with anxiety by grounding self, breathing, and listening to music.    Is this a Weekly Review of the Progress on the Treatment Plan?  No
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
## 2018-02-16 ENCOUNTER — HOSPITAL ENCOUNTER (OUTPATIENT)
Dept: BEHAVIORAL HEALTH | Facility: CLINIC | Age: 23
End: 2018-02-16
Attending: PSYCHIATRY & NEUROLOGY
Payer: COMMERCIAL

## 2018-02-16 PROCEDURE — 97150 GROUP THERAPEUTIC PROCEDURES: CPT | Mod: GO

## 2018-02-16 PROCEDURE — H2012 BEHAV HLTH DAY TREAT, PER HR: HCPCS

## 2018-02-16 NOTE — PROGRESS NOTES
"Adult Mental Health Outpatient Group Therapy Progress Note     Client Initial Individualized Goals for Treatment: To be able to finish school and go to college and do my work there. To be able to keep up with my friendships and commitments to other people. To not want to die.\".    See Initial Treatment suggestions for the client during the time between Diagnostic Assessment and completion of the Master Individualized Treatment Plan.    Treatment Goals:     See above     Area of Treatment Focus:  Symptom Management    Therapeutic Interventions/Treatment Strategies:  Support, Feedback, Safety Assessments, Structured Activity and Education    Response to Treatment Strategies:  Accepted Feedback, Listened, Attentive, Accepted Support and Alert    Name of Group:   OT Skills    Description and Outcome:  Pt attended and participated in a structured occupational therapy group where intervention focused on coping through structured hands-on activities to improve function in valued roles, routines, and independent living skills.    Appeared much more relaxed in group today as compared to the previous session on Wednesday.  Engaged client in an assessment of her progress, she replied group is helpful and \"it's nice to be a part of something\".  Alluded outpt tx is more helpful than inpatient, and that being inpt was a strange experience.   Has an event this weekend where she is helping to prepare a meal - a little nervous as she hasn't done much planning, though feels it will go okay once she can think things through in her head.    Client demonstrated understanding of session content by engaging in leisure activity and utilizing the group for support.  Joined peers in a game while working on project.  Listened to clues read by writer, and was able to guess answers.  Had fun with activity, and it seemed to provide another positive distraction and opportunity to work with others.    Is this a Weekly Reivewof the Progress on the " Treatment Plan?  Yes    Are Treatment Plan Goals being addressed?  Yes, continue treatment goals    Are Treatment Plan Strategies to Address Goals Effective?  Yes, continue treatment strategies    Are there any current contracts in place?  No

## 2018-02-16 NOTE — PROGRESS NOTES
"Adult Mental Health Outpatient Group Therapy Progress Note     Client Initial Individualized Goals for Treatment: To be able to finish school and go to college and do my work there. To be able to keep up with my friendships and commitments to other people. To not want to die.\".    See Initial Treatment suggestions for the client during the time between Diagnostic Assessment and completion of the Master Individualized Treatment Plan.    Treatment Goals:  1.Client will notify staff when needing assistance to develop or implement a coping plan to manage suicidal or self injurious urges.Client will use coping plan for safety, as needed.  2. When in OT Agens will report progress to plan and follow through with a list of planned daily activities and tasks providing an update of progress one time weekly.  3. Agnes will use time in group therapy to discuss what gives her life purpose and meaning.  4. Agnes will use time in group therapy to work on skills to build social connections and giving feedback to peers in group.  5. Agnes will set at least one weekly goal related to developing wellness related behaviors by increasing physical activity, Agnes has expressed interest in a sefl defense class and will start looking for a class near her.     Area of Treatment Focus:  Symptom Management, Personal Safety, Abstinence/Relapse Prevention and Develop Socialization / Interpersonal Relationship Skills    Therapeutic Interventions/Treatment Strategies:  Support, Feedback, Safety Assessments, Problem Solving    Response to Treatment Strategies:  Accepted Feedback, Gave Feedback, Listened, Focused on Goals, Attentive and Accepted Support    Name of Group:  Psychotherapy     Description and Outcome:  Socorro reported she is not sleeping enough and has low energy.  She reports a good event on Wednesday when she realized her friends did not hate her or forget about her.  Highlighted positive feelings associated with " this event.  She reported plans to attend meetings this weekend related to trauma and a social event she is organizing.  Validated stress related to these events and reflected what positives might come from these events. She reported following through with group feedback to read to help her cope with distress and found it useful.  Reinforced skill use and reminded her of other things she has found helpful in the past such as grounding herself through her senses and deep breathing.  She appeared receptive to feedback.She reported ongoing suicidal thoughts with no plan or intent to act.    Client verbalized understanding of session content by stating plans to break tasks down into small chunks to help manage anxiety related to events.    Is this a Weekly Review of the Progress on the Treatment Plan?  No

## 2018-02-19 ENCOUNTER — HOSPITAL ENCOUNTER (OUTPATIENT)
Dept: BEHAVIORAL HEALTH | Facility: CLINIC | Age: 23
End: 2018-02-19
Attending: PSYCHIATRY & NEUROLOGY
Payer: COMMERCIAL

## 2018-02-19 PROCEDURE — 97150 GROUP THERAPEUTIC PROCEDURES: CPT | Mod: GO

## 2018-02-19 PROCEDURE — H2012 BEHAV HLTH DAY TREAT, PER HR: HCPCS

## 2018-02-19 NOTE — PROGRESS NOTES
"Adult Mental Health Outpatient Group Therapy Progress Note     Client Initial Individualized Goals for Treatment: To be able to finish school and go to college and do my work there. To be able to keep up with my friendships and commitments to other people. To not want to die.\".    See Initial Treatment suggestions for the client during the time between Diagnostic Assessment and completion of the Master Individualized Treatment Plan.    Treatment Goals:  1.Client will notify staff when needing assistance to develop or implement a coping plan to manage suicidal or self injurious urges.Client will use coping plan for safety, as needed.  2. When in OT Agnes will report progress to plan and follow through with a list of planned daily activities and tasks providing an update of progress one time weekly.  3. Agnes will use time in group therapy to discuss what gives her life purpose and meaning.  4. Agnes will use time in group therapy to work on skills to build social connections and giving feedback to peers in group.  5. Agnes will set at least one weekly goal related to developing wellness related behaviors by increasing physical activity, Agnes has expressed interest in a sefl defense class and will start looking for a class near her.     Area of Treatment Focus:  Symptom Management, Develop / Improve Independent Living Skills and Develop Socialization / Interpersonal Relationship Skills    Therapeutic Interventions/Treatment Strategies:  Support, Feedback, Problem Solving and Education    Response to Treatment Strategies:  Accepted Feedback, Gave Feedback, Listened, Focused on Goals, Attentive and Accepted Support    Name of Group:  Psychotherapy and Mental Health Management     Description and Outcome:  Hour one (Psychotherapy): Socorro reported attending both events she had planned and that they went well.  Aided in identifying and reinforcing skills used to help her get through these events and " highlighted positive feelings.  She reported she has been missing classes for school and feels overwhelmed.  Validated and normalized.  Provided skill suggestions that might help her to attend classes and cope with anxiety.  Socorro reported plans to attend a group about trauma with a friend and acknowledged this sounded as though it could be difficult for her.  Taught on importance of balancing need of her friend to have support with Socorro's needs to feel safe.  Encouraged her to set boundaries if she feels she is not able to attend to that meeting at this time due to her own symptoms.  She appeared receptive and did not endorse safety concerns.    Client verbalized understanding of session content by stating plans to attend class and balance caring for friends vs. Caring for self.    Hour two (mental health management):  Facilitator taught and led discussion on what emotions are, why they are important, and how to take steps towards regulating them.  Provided handouts on primary and secondary emotions as well as on the connection between thoughts, feelings, and behaviors.  Discussed how to intervene with effective behaviors when having difficult thoughts or feelings to stop negative cycles and engage in more skillful actions.  The Client was asked to participate by giving examples of thoughts, feelings, and behaviors they might have in given scenarios and what skills they might try to implement to have more control over their emotions.  Client participated actively and effectively in conversation.    Client would benefit from additional opportunities to practice and implement content from this session by taking steps to raise awareness of how thoughts and feelings are impacting action urges and work to create more effective urges to action.    Is this a Weekly Review of the Progress on the Treatment Plan?  Yes.      Are Treatment Plan Goals being addressed?  Yes, continue treatment goals      Are Treatment Plan  Strategies to Address Goals Effective?  Yes, continue treatment strategies      Are there any current contracts in place?  No

## 2018-02-19 NOTE — PROGRESS NOTES
"Adult Mental Health Outpatient Group Therapy Progress Note     Client Initial Individualized Goals for Treatment: To be able to finish school and go to college and do my work there. To be able to keep up with my friendships and commitments to other people. To not want to die.\".    See Initial Treatment suggestions for the client during the time between Diagnostic Assessment and completion of the Master Individualized Treatment Plan.    Treatment Goals:     See above     Area of Treatment Focus:  Symptom Management    Therapeutic Interventions/Treatment Strategies:  Support, Feedback, Safety Assessments, Structured Activity and Education    Response to Treatment Strategies:  Accepted Feedback, Listened, Attentive, Accepted Support and Alert    Name of Group:   OT Skills    Description and Outcome:  Pt attended and participated in a structured occupational therapy group where intervention focused on coping through structured hands-on activities to improve function in valued roles, routines, and independent living skills.    Client reported her events this weekend went well, though presented as more anxious when asked how other things were going.  Writer attempted to engage her in discussion about school, asking about her research project, as she appeared interested in talking about it when first telling writer about it a few weeks ago.  Client stated \"oh, I just don't want to talk about school right now\".  Spent the hour working on her collage and resuming game with writer and peers while engaged in activity.      Is this a Weekly Reivewof the Progress on the Treatment Plan?  No   "

## 2018-02-21 ENCOUNTER — HOSPITAL ENCOUNTER (OUTPATIENT)
Dept: BEHAVIORAL HEALTH | Facility: CLINIC | Age: 23
End: 2018-02-21
Attending: PSYCHIATRY & NEUROLOGY
Payer: COMMERCIAL

## 2018-02-21 PROCEDURE — H2012 BEHAV HLTH DAY TREAT, PER HR: HCPCS

## 2018-02-21 PROCEDURE — 97150 GROUP THERAPEUTIC PROCEDURES: CPT | Mod: GO

## 2018-02-21 NOTE — PROGRESS NOTES
"Adult Mental Health Outpatient Group Therapy Progress Note     Client Initial Individualized Goals for Treatment: To be able to finish school and go to college and do my work there. To be able to keep up with my friendships and commitments to other people. To not want to die.\".    See Initial Treatment suggestions for the client during the time between Diagnostic Assessment and completion of the Master Individualized Treatment Plan.    Treatment Goals:  1.Client will notify staff when needing assistance to develop or implement a coping plan to manage suicidal or self injurious urges.Client will use coping plan for safety, as needed.  2. When in OT Agnes will report progress to plan and follow through with a list of planned daily activities and tasks providing an update of progress one time weekly.  3. Agnes will use time in group therapy to discuss what gives her life purpose and meaning.  4. Agnes will use time in group therapy to work on skills to build social connections and giving feedback to peers in group.  5. Agnes will set at least one weekly goal related to developing wellness related behaviors by increasing physical activity, Agnes has expressed interest in a sefl defense class and will start looking for a class near her.     Area of Treatment Focus:  Symptom Management, Develop / Improve Independent Living Skills and Develop Socialization / Interpersonal Relationship Skills    Therapeutic Interventions/Treatment Strategies:  Support, Feedback, Problem Solving     Response to Treatment Strategies:  Accepted Feedback, Gave Feedback, Listened, Focused on Goals, Attentive and Accepted Support    Name of Group:  Psychotherapy     Description and Outcome:  Socorro reported not getting enough sleep due to staying up talking to her boyfriend.  Encouraged her to set boundaries around when they can have long conversations to do this earlier in the day.  She reports she has avoided talking with him " during the day due to symptoms and feels like she has to do it at night.  Challenged her to move conversations back in the day to reduce their interference in her sleep.  She reported sleeping all day and making plans to meet with friends.  She reported dropping her classes at this time.  Explored what Socorro is going to do to maintain positive daily structure without classes and provided suggestions.  She appeared receptive and did not endorse safety concerns.    Client verbalized understanding of session content by stating plans to attend to relationships with her partner earlier in the day and call her mom.      Is this a Weekly Review of the Progress on the Treatment Plan?  No

## 2018-02-21 NOTE — PROGRESS NOTES
"Adult Mental Health Outpatient Group Therapy Progress Note     Client Initial Individualized Goals for Treatment: To be able to finish school and go to college and do my work there. To be able to keep up with my friendships and commitments to other people. To not want to die.\".    See Initial Treatment suggestions for the client during the time between Diagnostic Assessment and completion of the Master Individualized Treatment Plan.    Treatment Goals:     See above     Area of Treatment Focus:  Symptom Management    Therapeutic Interventions/Treatment Strategies:  Support, Feedback, Safety Assessments, Structured Activity and Education    Response to Treatment Strategies:  Accepted Feedback, Listened, Attentive, Accepted Support and Alert    Name of Group:   OT Skills    Description and Outcome:  Pt attended and participated in a structured occupational therapy group where intervention focused on coping through structured hands-on activities to improve function in valued roles, routines, and independent living skills.    Client reported her events this weekend went well, though presented as more anxious when asked how other things were going.  Writer attempted to engage her in discussion about school, asking about her research project, as she appeared interested in talking about it when first telling writer about it a few weeks ago.  Client stated \"oh, I just don't want to talk about school right now\".  Spent the hour working on her collage and [employer] game with writer and peers while engaged in activity.      Is this a Weekly Reivewof the Progress on the Treatment Plan?  No   Adult Mental Health Outpatient Group Therapy Progress Note     Client Initial Individualized Goals for Treatment: To be able to finish school and go to college and do my work there. To be able to keep up with my friendships and commitments to other people. To not want to die.\".    See Initial Treatment suggestions for the client during the " time between Diagnostic Assessment and completion of the Master Individualized Treatment Plan.    Treatment Goals:     See above     Area of Treatment Focus:  Symptom Management    Therapeutic Interventions/Treatment Strategies:  Support, Feedback, Safety Assessments, Structured Activity and Education    Response to Treatment Strategies:  Accepted Feedback, Listened, Attentive, Accepted Support and Alert    Name of Group:   OT Skills    Description and Outcome:  Pt attended and participated in a structured occupational therapy group where intervention focused on coping through structured hands-on activities to improve function in valued roles, routines, and independent living skills.    Client attended group, quietly worked on her collage.  Occasional interaction (group as a whole was quiet) when conversation was initiated.  Didn't bring up any particular stressors, though seemed relaxed while engaged in activity.    Is this a Weekly Reivewof the Progress on the Treatment Plan?  No

## 2018-02-23 ENCOUNTER — HOSPITAL ENCOUNTER (OUTPATIENT)
Dept: BEHAVIORAL HEALTH | Facility: CLINIC | Age: 23
End: 2018-02-23
Attending: PSYCHIATRY & NEUROLOGY
Payer: COMMERCIAL

## 2018-02-23 PROCEDURE — H2012 BEHAV HLTH DAY TREAT, PER HR: HCPCS

## 2018-02-23 PROCEDURE — 97150 GROUP THERAPEUTIC PROCEDURES: CPT | Mod: GO

## 2018-02-23 NOTE — PROGRESS NOTES
"Adult Mental Health Outpatient Group Therapy Progress Note     Client Initial Individualized Goals for Treatment: To be able to finish school and go to college and do my work there. To be able to keep up with my friendships and commitments to other people. To not want to die.\".    See Initial Treatment suggestions for the client during the time between Diagnostic Assessment and completion of the Master Individualized Treatment Plan.    Treatment Goals:  1.Client will notify staff when needing assistance to develop or implement a coping plan to manage suicidal or self injurious urges.Client will use coping plan for safety, as needed.  2. When in OT Agnes will report progress to plan and follow through with a list of planned daily activities and tasks providing an update of progress one time weekly.  3. Agnes will use time in group therapy to discuss what gives her life purpose and meaning.  4. Agnes will use time in group therapy to work on skills to build social connections and giving feedback to peers in group.  5. Agnes will set at least one weekly goal related to developing wellness related behaviors by increasing physical activity, Agnes has expressed interest in a sefl defense class and will start looking for a class near her.     Area of Treatment Focus:  Symptom Management, Develop / Improve Independent Living Skills and Develop Socialization / Interpersonal Relationship Skills    Therapeutic Interventions/Treatment Strategies:  Support, Feedback, Problem Solving     Response to Treatment Strategies:  Accepted Feedback, Gave Feedback, Listened, Focused on Goals, Attentive and Accepted Support    Name of Group:  Psychotherapy     Description and Outcome:  Socorro reported following through with her plan to talk to her partner to attend to relationships and noted it went well.  Reflected that as Client was saying this she appeared to be upset.  She became tearful and stated she was experiencing " disassociation during physical intimacy and was not sure what to do.  Validated distress and normalized.  Encouraged her to set boundaries on when and how she is comfortable being physically intimate.  Taught on where disassociation comes from and why.  Provided skill suggestions to help her feel more safe in her body when dissociating.  She reported increased stress.  Connected to increased social experiences.  She noted importance of social experiences but agreed they are leading to increased stress.  Validated and encouraged taking small steps out of comfort zone without overwhelming self.  She appeared receptive to feedback and denied safety concerns.    Client verbalized understanding of session content by stating plans to attend to social obligations but monitor how she is feeling to manage feeling overwhelmed.    Is this a Weekly Review of the Progress on the Treatment Plan?  No

## 2018-02-23 NOTE — PROGRESS NOTES
"Adult Mental Health Outpatient Group Therapy Progress Note     Client Initial Individualized Goals for Treatment: To be able to finish school and go to college and do my work there. To be able to keep up with my friendships and commitments to other people. To not want to die.\".    See Initial Treatment suggestions for the client during the time between Diagnostic Assessment and completion of the Master Individualized Treatment Plan.    Treatment Goals:     See above     Area of Treatment Focus:  Symptom Management    Therapeutic Interventions/Treatment Strategies:  Support, Feedback, Safety Assessments, Structured Activity and Education    Response to Treatment Strategies:  Accepted Feedback, Listened, Attentive, Accepted Support and Alert    Name of Group:   OT Skills    Description and Outcome:  Pt attended and participated in a structured occupational therapy group where intervention focused on coping through structured hands-on activities to improve function in valued roles, routines, and independent living skills.    Client reported her events this weekend went well, though presented as more anxious when asked how other things were going.  Writer attempted to engage her in discussion about school, asking about her research project, as she appeared interested in talking about it when first telling writer about it a few weeks ago.  Client stated \"oh, I just don't want to talk about school right now\".  Spent the hour working on her collage and [employer] game with writer and peers while engaged in activity.      Is this a Weekly Reivewof the Progress on the Treatment Plan?  No   Adult Mental Health Outpatient Group Therapy Progress Note     Client Initial Individualized Goals for Treatment: To be able to finish school and go to college and do my work there. To be able to keep up with my friendships and commitments to other people. To not want to die.\".    See Initial Treatment suggestions for the client during the " time between Diagnostic Assessment and completion of the Master Individualized Treatment Plan.    Treatment Goals:     See above     Area of Treatment Focus:  Symptom Management    Therapeutic Interventions/Treatment Strategies:  Support, Feedback, Safety Assessments, Structured Activity and Education    Response to Treatment Strategies:  Accepted Feedback, Listened, Attentive, Accepted Support and Alert    Name of Group:   OT Skills    Description and Outcome:  Client attended and participated in a structured occupational therapy group where intervention focused on coping through structured hands-on activities to improve function in valued roles, routines, and independent living skills.    Pt attended a yoga group to promote wellness and relaxation.  Full participation in activity, did not join in when other peers initially started making joking comments about the activity and commentary during the video.  Engaged in discussion about overall wellness, and ideas to do at home/things she used to do more often.    Client demonstrated understanding of session content by participating in activity.      Is this a Weekly Reivewof the Progress on the Treatment Plan?  Yes    Are Treatment Plan Goals being addressed?  Yes, continue treatment goals    Are Treatment Plan Strategies to Address Goals Effective?  Yes, continue treatment strategies    Are there any current contracts in place?  No

## 2018-02-26 ENCOUNTER — HOSPITAL ENCOUNTER (OUTPATIENT)
Dept: BEHAVIORAL HEALTH | Facility: CLINIC | Age: 23
End: 2018-02-26
Attending: PSYCHIATRY & NEUROLOGY
Payer: COMMERCIAL

## 2018-02-26 PROCEDURE — H2012 BEHAV HLTH DAY TREAT, PER HR: HCPCS

## 2018-02-26 PROCEDURE — 97150 GROUP THERAPEUTIC PROCEDURES: CPT | Mod: GO

## 2018-02-26 NOTE — PROGRESS NOTES
"Adult Mental Health Outpatient Group Therapy Progress Note     Client Initial Individualized Goals for Treatment: To be able to finish school and go to college and do my work there. To be able to keep up with my friendships and commitments to other people. To not want to die.\".    See Initial Treatment suggestions for the client during the time between Diagnostic Assessment and completion of the Master Individualized Treatment Plan.    Treatment Goals:     See above     Area of Treatment Focus:  Symptom Management    Therapeutic Interventions/Treatment Strategies:  Support, Feedback, Safety Assessments, Structured Activity and Education    Response to Treatment Strategies:  Accepted Feedback, Listened, Attentive, Accepted Support and Alert    Name of Group:   OT Skills    Description and Outcome:  Pt attended and participated in a structured occupational therapy group where intervention focused on coping through structured hands-on activities to improve function in valued roles, routines, and independent living skills.    Client reported her events this weekend went well, though presented as more anxious when asked how other things were going.  Writer attempted to engage her in discussion about school, asking about her research project, as she appeared interested in talking about it when first telling writer about it a few weeks ago.  Client stated \"oh, I just don't want to talk about school right now\".  Spent the hour working on her collage and [employer] game with writer and peers while engaged in activity.      Is this a Weekly Reivewof the Progress on the Treatment Plan?  No   Adult Mental Health Outpatient Group Therapy Progress Note     Client Initial Individualized Goals for Treatment: To be able to finish school and go to college and do my work there. To be able to keep up with my friendships and commitments to other people. To not want to die.\".    See Initial Treatment suggestions for the client during the " time between Diagnostic Assessment and completion of the Master Individualized Treatment Plan.    Treatment Goals:     See above     Area of Treatment Focus:  Symptom Management    Therapeutic Interventions/Treatment Strategies:  Support, Feedback, Safety Assessments, Structured Activity and Education    Response to Treatment Strategies:  Accepted Feedback, Listened, Attentive, Accepted Support and Alert    Name of Group:   OT Skills    Description and Outcome:  Pt attended and participated in a structured occupational therapy group where intervention focused on coping through structured hands-on activities to improve function in valued roles, routines, and independent living skills.    Client spent time working continuing on her collage, which she really seems to enjoy.  Occasionally jumps into group conversation while peers are also working on their projects.  Does not seem to like having much attention on her - only answered briefly when writer asked how her weekend was, and readily asked writer how my weekend was instead.    Client would benefit from additional opportunities to practice and implement content from this session.      Is this a Weekly Reivewof the Progress on the Treatment Plan?  No

## 2018-02-26 NOTE — PROGRESS NOTES
"Adult Mental Health Outpatient Group Therapy Progress Note     Client Initial Individualized Goals for Treatment: To be able to finish school and go to college and do my work there. To be able to keep up with my friendships and commitments to other people. To not want to die.\".    See Initial Treatment suggestions for the client during the time between Diagnostic Assessment and completion of the Master Individualized Treatment Plan.    Treatment Goals:  1.Client will notify staff when needing assistance to develop or implement a coping plan to manage suicidal or self injurious urges.Client will use coping plan for safety, as needed.  2. When in OT Agnes will report progress to plan and follow through with a list of planned daily activities and tasks providing an update of progress one time weekly.  3. Agnes will use time in group therapy to discuss what gives her life purpose and meaning.  4. Agnes will use time in group therapy to work on skills to build social connections and giving feedback to peers in group.  5. gAnes will set at least one weekly goal related to developing wellness related behaviors by increasing physical activity, Agnes has expressed interest in a sefl defense class and will start looking for a class near her.     Area of Treatment Focus:  Symptom Management, Personal Safety, Develop / Improve Independent Living Skills and Develop Socialization / Interpersonal Relationship Skills    Therapeutic Interventions/Treatment Strategies:  Support, Feedback, Safety Assessments, Problem Solving and Education    Response to Treatment Strategies:  Accepted Feedback, Gave Feedback and Accepted Support    Name of Group:  Psychotherapy and Mental Health Management     Description and Outcome:  Hour two (Psychotherapy): Socorro reported feeling unsure of how her weekend was.  She reported struggling to have fun with others at times due to missing social cues.  Validated and highlighted skills " used to attend social engagements.  She reported attending her demonstration and noted feeling positive about this.  Connected demonstration to attending to her values and reinforced.  She reported engaging in overeating and feeling disconnected from her own life.  She noted urges to slowly isolate herself from loved ones to avoid hurting them should she ever chose to act on suicidal thoughts.  She reported suicidal thoughts with no plan or intent at this time.  Validated and challenged her to act opposite to her emotions by reaching out to loved ones over the next few days.  She appeared receptive to feedback and will follow her safety plan.    Client verbalized understanding of session content by stating plans to try to eat smaller meals and remain connected to supports.    Hour three (Mental Health Management): Facilitator highlighted potential downfalls of having a group chat.  Discussed how to set and maintain appropriate boundaries and not to use the group chat to discuss therapy issues.  Taught and led discussion on what makes boundary setting with peers, strangers, and loved ones difficulty as well as steps to take to improve ability to assert needs and set boundaries.  Client occasionally and effectively in discussion.    Client would benefit from additional opportunities to practice and implement content from this session.      Is this a Weekly Review of the Progress on the Treatment Plan?  Yes.      Are Treatment Plan Goals being addressed?  Yes, continue treatment goals      Are Treatment Plan Strategies to Address Goals Effective?  Yes, continue treatment strategies      Are there any current contracts in place?  No

## 2018-02-28 ENCOUNTER — HOSPITAL ENCOUNTER (OUTPATIENT)
Dept: BEHAVIORAL HEALTH | Facility: CLINIC | Age: 23
End: 2018-02-28
Attending: PSYCHIATRY & NEUROLOGY
Payer: COMMERCIAL

## 2018-02-28 PROCEDURE — H2012 BEHAV HLTH DAY TREAT, PER HR: HCPCS

## 2018-02-28 PROCEDURE — 97150 GROUP THERAPEUTIC PROCEDURES: CPT | Mod: GO

## 2018-02-28 NOTE — PROGRESS NOTES
"  Adult Mental Health Outpatient Group Therapy Progress Note     Client Initial Individualized Goals for Treatment: To be able to finish school and go to college and do my work there. To be able to keep up with my friendships and commitments to other people. To not want to die.\".    See Initial Treatment suggestions for the client during the time between Diagnostic Assessment and completion of the Master Individualized Treatment Plan.    Treatment Goals:     See above     Area of Treatment Focus:  Symptom Management    Therapeutic Interventions/Treatment Strategies:  Support, Feedback, Safety Assessments, Structured Activity and Education    Response to Treatment Strategies:  Accepted Feedback, Listened, Attentive, Accepted Support and Alert    Name of Group:   OT Skills    Description and Outcome:  Pt attended and participated in a structured occupational therapy group where intervention focused on coping through structured hands-on activities to improve function in valued roles, routines, and independent living skills.    Client spent time working continuing on her collage, which she really seems to enjoy.    Checked in about tx plan goals.  Client states all are relevant and they would like to continue working on the same goals with no changes or additions.  Becomes very uncomfortable with discussion focused on her, states to just keep the tx plans the same.  Client was offered the opportunity to take some collage and relaxation coloring supplies home to promote carryover of hobbies and relaxation activities, though she declined.      Client would benefit from additional opportunities to practice and implement content from this session.      Is this a Weekly Reivewof the Progress on the Treatment Plan?  No   "

## 2018-02-28 NOTE — PROGRESS NOTES
"Adult Mental Health Outpatient Group Therapy Progress Note     Client Initial Individualized Goals for Treatment: To be able to finish school and go to college and do my work there. To be able to keep up with my friendships and commitments to other people. To not want to die.\".    See Initial Treatment suggestions for the client during the time between Diagnostic Assessment and completion of the Master Individualized Treatment Plan.    Treatment Goals:  1.Client will notify staff when needing assistance to develop or implement a coping plan to manage suicidal or self injurious urges.Client will use coping plan for safety, as needed.  2. When in OT Agnes will report progress to plan and follow through with a list of planned daily activities and tasks providing an update of progress one time weekly.  3. Agnes will use time in group therapy to discuss what gives her life purpose and meaning.  4. Agnes will use time in group therapy to work on skills to build social connections and giving feedback to peers in group.  5. Agnes will set at least one weekly goal related to developing wellness related behaviors by increasing physical activity, Agnes has expressed interest in a sefl defense class and will start looking for a class near her.     Area of Treatment Focus:  Symptom Management, Personal Safety, Develop / Improve Independent Living Skills and Develop Socialization / Interpersonal Relationship Skills    Therapeutic Interventions/Treatment Strategies:  Support, Feedback, Safety Assessments, Problem Solving    Response to Treatment Strategies:  Accepted Feedback, Gave Feedback and Accepted Support    Name of Group:  Psychotherapy     Description and Outcome:   Socorro reported she was \"okay\" but then followed this up with she felt hopeless to accomplish anything.  Validated stress and explored ways in which she might reconnect to things that are meaningful in her life.  She reported concerns about " overeating, weight gain, and not liking how she looked.  She denied connection of increased appetite to medication changes.  Provided skill suggestions related to taking small steps towards coping by drinking water, going for walks, and changing personal style.  She reported feeling stupid for having these thoughts as she does not care about how she looks.  Challenged her to be gentle with herself as it does not help her to shame herself for having these thoughts and feelings.  She appeared receptive and denied safety concerns.    Client verbalized understanding of session content by stating plans to focus on attending to her physical health.    Is this a Weekly Review of the Progress on the Treatment Plan?  No

## 2018-03-02 ENCOUNTER — HOSPITAL ENCOUNTER (OUTPATIENT)
Dept: BEHAVIORAL HEALTH | Facility: CLINIC | Age: 23
End: 2018-03-02
Attending: PSYCHIATRY & NEUROLOGY
Payer: COMMERCIAL

## 2018-03-02 PROCEDURE — H2012 BEHAV HLTH DAY TREAT, PER HR: HCPCS

## 2018-03-02 PROCEDURE — 97150 GROUP THERAPEUTIC PROCEDURES: CPT | Mod: GO

## 2018-03-02 NOTE — PROGRESS NOTES
"Adult Mental Health Outpatient Group Therapy Progress Note     Client Initial Individualized Goals for Treatment: To be able to finish school and go to college and do my work there. To be able to keep up with my friendships and commitments to other people. To not want to die.\".    See Initial Treatment suggestions for the client during the time between Diagnostic Assessment and completion of the Master Individualized Treatment Plan.    Treatment Goals:  1.Client will notify staff when needing assistance to develop or implement a coping plan to manage suicidal or self injurious urges.Client will use coping plan for safety, as needed.  2. When in OT Agnes will report progress to plan and follow through with a list of planned daily activities and tasks providing an update of progress one time weekly.  3. Agnes will use time in group therapy to discuss what gives her life purpose and meaning.  4. Agnes will use time in group therapy to work on skills to build social connections and giving feedback to peers in group.  5. Agnes will set at least one weekly goal related to developing wellness related behaviors by increasing physical activity, Agnes has expressed interest in a sefl defense class and will start looking for a class near her.     Area of Treatment Focus:  Symptom Management, Personal Safety, Develop / Improve Independent Living Skills and Develop Socialization / Interpersonal Relationship Skills    Therapeutic Interventions/Treatment Strategies:  Support, Feedback, Safety Assessments, Problem Solving    Response to Treatment Strategies:  Accepted Feedback, Gave Feedback and Accepted Support    Name of Group:  Psychotherapy     Description and Outcome:  Socorro reported ignoring school tasks and visa related tasks with increasing anxiety.  Validated stress and normalized difficulty in attending to these issues.  Taught on using a fear hierarchy to break difficult tasks down into smaller pieces " and begin tackling the issue with the least anxiety provoking.  Provided skill suggestions around using occupational therapy, partner, and individual therapist for support in checking emails and attending to the school issues.  She appeared receptive to feedback and did not endorse safety concerns.    Client verbalized understanding of session content by stating plans to take small steps forward on school tasks and make an appointment with her .    Is this a Weekly Review of the Progress on the Treatment Plan?  No

## 2018-03-02 NOTE — PROGRESS NOTES
"Adult Mental Health Outpatient Group Therapy Progress Note     Client Initial Individualized Goals for Treatment: To be able to finish school and go to college and do my work there. To be able to keep up with my friendships and commitments to other people. To not want to die.\".    See Initial Treatment suggestions for the client during the time between Diagnostic Assessment and completion of the Master Individualized Treatment Plan.    Treatment Goals:  1.Client will notify staff when needing assistance to develop or implement a coping plan to manage suicidal or self injurious urges.Client will use coping plan for safety, as needed.  2. When in OT Agnes will report progress to plan and follow through with a list of planned daily activities and tasks providing an update of progress one time weekly.  3. Agnes will use time in group therapy to discuss what gives her life purpose and meaning.  4. Agnes will use time in group therapy to work on skills to build social connections and giving feedback to peers in group.  5. Agnes will set at least one weekly goal related to developing wellness related behaviors by increasing physical activity, Agnes has expressed interest in a sefl defense class and will start looking for a class near her.     Area of Treatment Focus:  Symptom Management    Therapeutic Interventions/Treatment Strategies:  Support, Feedback, Safety Assessments, Structured Activity and Education    Response to Treatment Strategies:  Accepted Feedback, Listened, Attentive, Accepted Support and Alert    Name of Group:   OT Clinic    Description and Outcome:  Client presented as calm and alert. When asked about her mood she reported that she has not been feeling well physically for several week. She said that she has been feeling nauseated most every day for the past several weeks. Suggested that she visit a clinic to get evaluated.Goal #1 (no personal safety concerns reported or observed). " Goal #2 She reported that she has been doing some good things for herself like cooking. She was taking a class but needed to drop out because of anxiety. Weekend plans include going to a friend's story telling show on Sunday.  Client would benefit from additional opportunities to practice and implement content from this session in her everyday life and mental health recovery.    Is this a Weekly Review of the Progress on the Treatment Plan?  Yes.      Are Treatment Plan Goals being addressed?  Yes, continue treatment goals      Are Treatment Plan Strategies to Address Goals Effective?  Yes, continue treatment strategies      Are there any current contracts in place?  No

## 2018-03-12 ENCOUNTER — TELEPHONE (OUTPATIENT)
Dept: BEHAVIORAL HEALTH | Facility: CLINIC | Age: 23
End: 2018-03-12

## 2018-03-12 NOTE — TELEPHONE ENCOUNTER
Attempted to call to leave a voicemail inquiring as to absence from programming but received a message stating Client's voicemail had not yet been set up.

## 2018-03-14 ENCOUNTER — HOSPITAL ENCOUNTER (OUTPATIENT)
Dept: BEHAVIORAL HEALTH | Facility: CLINIC | Age: 23
End: 2018-03-14
Attending: PSYCHIATRY & NEUROLOGY
Payer: COMMERCIAL

## 2018-03-14 PROCEDURE — H2012 BEHAV HLTH DAY TREAT, PER HR: HCPCS

## 2018-03-14 PROCEDURE — 97150 GROUP THERAPEUTIC PROCEDURES: CPT | Mod: GO

## 2018-03-14 NOTE — PROGRESS NOTES
"Adult Mental Health Outpatient Group Therapy Progress Note     Client Initial Individualized Goals for Treatment: To be able to finish school and go to college and do my work there. To be able to keep up with my friendships and commitments to other people. To not want to die.\".    See Initial Treatment suggestions for the client during the time between Diagnostic Assessment and completion of the Master Individualized Treatment Plan.    Treatment Goals:  1.Client will notify staff when needing assistance to develop or implement a coping plan to manage suicidal or self injurious urges.Client will use coping plan for safety, as needed.  2. When in OT Agnes will report progress to plan and follow through with a list of planned daily activities and tasks providing an update of progress one time weekly.  3. Agnes will use time in group therapy to discuss what gives her life purpose and meaning.  4. Agnes will use time in group therapy to work on skills to build social connections and giving feedback to peers in group.  5. Agnes will set at least one weekly goal related to developing wellness related behaviors by increasing physical activity, Agnes has expressed interest in a sefl defense class and will start looking for a class near her.     Area of Treatment Focus:  Symptom Management, Personal Safety, Develop / Improve Independent Living Skills and Develop Socialization / Interpersonal Relationship Skills    Therapeutic Interventions/Treatment Strategies:  Support, Feedback, Safety Assessments and Problem Solving    Response to Treatment Strategies:  Accepted Feedback, Gave Feedback, Listened, Focused on Goals, Attentive and Accepted Support    Name of Group:  Psychotherapy     Description and Outcome:  Socorro reported seeing her family in [place].  She noted feeling glad because she missed her family but tension as she feels less connected to them.  Aided in identifying and validating mixed feelings. "  She reports wanting to reconnect with her family but concerns they (especially mom) would not be proud of her because she has not accomplished anything.  Challenged this belief by highlighting accomplishments with social organizations.  She stated desire to have a plan in life and stress that she did not have one. Validated and normalized.  Reminded Client that just because her path was not the same as what society says is what she is supposed to be doing does not mean it is wrong; encouraged her to look into how she can connect more with social advocacy and organizations.  Socorro reported guilt for acting on suicidal thoughts (prior to treatment) despite feeling numb.  Validated distress and challenged self-judgments.  She denied safety concerns at present.  She appeared tearful and receptive to feedback.      Client verbalized understanding of session content by stating plans to attend to social advocacy by creating a poster and will write a loose plan for her life.    Is this a Weekly Review of the Progress on the Treatment Plan?  Yes.      Are Treatment Plan Goals being addressed?  Yes, continue treatment goals      Are Treatment Plan Strategies to Address Goals Effective?  Yes, continue treatment strategies      Are there any current contracts in place?  No

## 2018-03-14 NOTE — PROGRESS NOTES
"  Adult Mental Health Outpatient Group Therapy Progress Note     Client Initial Individualized Goals for Treatment: To be able to finish school and go to college and do my work there. To be able to keep up with my friendships and commitments to other people. To not want to die.\".    See Initial Treatment suggestions for the client during the time between Diagnostic Assessment and completion of the Master Individualized Treatment Plan.    Treatment Goals:     See above     Area of Treatment Focus:  Symptom Management    Therapeutic Interventions/Treatment Strategies:  Support, Feedback, Safety Assessments, Structured Activity and Education    Response to Treatment Strategies:  Accepted Feedback, Listened, Attentive, Accepted Support and Alert    Name of Group:   OT Skills    Description and Outcome:  Pt attended and participated in a structured occupational therapy group where intervention focused on coping through structured hands-on activities to improve function in valued roles, routines, and independent living skills.    Client attended morning OT group, spent most of the session on her laptop, stating she was working on preparing a reading she was doing at a [facility] this weekend.      Spoke a bit about her visit to [place] to see her mom and sister.  Stated it was nice to see family, though seemed uncomfortable in discussing it too much (which is her usual in discussing personal matters), pleasant, though became somewhat short and abrupt with her answers.    Client would benefit from additional opportunities to practice and implement content from this session.      Is this a Weekly Reivewof the Progress on the Treatment Plan?  No   "

## 2018-03-16 ENCOUNTER — HOSPITAL ENCOUNTER (OUTPATIENT)
Dept: BEHAVIORAL HEALTH | Facility: CLINIC | Age: 23
End: 2018-03-16
Attending: PSYCHIATRY & NEUROLOGY
Payer: COMMERCIAL

## 2018-03-16 PROCEDURE — 97150 GROUP THERAPEUTIC PROCEDURES: CPT | Mod: GO

## 2018-03-16 PROCEDURE — H2012 BEHAV HLTH DAY TREAT, PER HR: HCPCS

## 2018-03-16 NOTE — PROGRESS NOTES
"Adult Mental Health Outpatient Group Therapy Progress Note     Client Initial Individualized Goals for Treatment: To be able to finish school and go to college and do my work there. To be able to keep up with my friendships and commitments to other people. To not want to die.\".    See Initial Treatment suggestions for the client during the time between Diagnostic Assessment and completion of the Master Individualized Treatment Plan.    Treatment Goals:  1.Client will notify staff when needing assistance to develop or implement a coping plan to manage suicidal or self injurious urges.Client will use coping plan for safety, as needed.  2. When in OT Agnes will report progress to plan and follow through with a list of planned daily activities and tasks providing an update of progress one time weekly.  3. Agnes will use time in group therapy to discuss what gives her life purpose and meaning.  4. Agnes will use time in group therapy to work on skills to build social connections and giving feedback to peers in group.  5. Agnes will set at least one weekly goal related to developing wellness related behaviors by increasing physical activity, Agnes has expressed interest in a sefl defense class and will start looking for a class near her.     Area of Treatment Focus:  Symptom Management, Personal Safety, Develop / Improve Independent Living Skills and Develop Socialization / Interpersonal Relationship Skills    Therapeutic Interventions/Treatment Strategies:  Support, Feedback, Safety Assessments and Problem Solving    Response to Treatment Strategies:  Accepted Feedback, Gave Feedback, Listened, Focused on Goals, Attentive and Accepted Support    Name of Group:  Psychotherapy     Description and Outcome:  Socorro reported following through on plan to create posters for upcoming dinner for social advocacy group.  Highlighted positive feelings associated with attending to her interests.  She invited peers; " reminded Clients of boundaries and confidentiality rules.  She reported feeling excited and nervous for this dinner.  She reported plans with a friend to see a storytelling show and tentative plans with another couple.  She reported improved mood.  Connected increased engagement in social activities and meaningful events to improved mood.  She reported her boyfriend met her mom and it went well.  Reinforced skills used to get through anxiety and pursue meaningful things.  She appeared receptive to feedback and did not endorse safety concerns    Client verbalized understanding of session content by stating plans to manage anxiety around upcoming dinner and attend to friendships.    Is this a Weekly Review of the Progress on the Treatment Plan?  No

## 2018-03-16 NOTE — PROGRESS NOTES
"  Adult Mental Health Outpatient Group Therapy Progress Note     Client Initial Individualized Goals for Treatment: To be able to finish school and go to college and do my work there. To be able to keep up with my friendships and commitments to other people. To not want to die.\".    See Initial Treatment suggestions for the client during the time between Diagnostic Assessment and completion of the Master Individualized Treatment Plan.    Treatment Goals:     See above     Area of Treatment Focus:  Symptom Management    Therapeutic Interventions/Treatment Strategies:  Support, Feedback, Safety Assessments, Structured Activity and Education    Response to Treatment Strategies:  Accepted Feedback, Listened, Attentive, Accepted Support and Alert    Name of Group:   OT Skills    Description and Outcome:  Pt attended and participated in a structured occupational therapy group where intervention focused on coping through structured hands-on activities to improve function in valued roles, routines, and independent living skills.    Client attended morning OT group, spent time on the computer putting images together for a poster for her event this weekend.  Discussed, and gave client positive feedback on her involvement in causes she believes in.  Continued on with brief discussion where client shared their knowledge of local government/ advocacy groups.  Client is feeling stuck as far as her education due to her mental health and not being able to move forward with her desire to study music therapy, remains unsure of her interests moving forward.  Questioned if she'd be interested in a political science direction, though she replied \"no way! I hate politics\", and reflected a bit on this.  Remains slightly more talkative on subjects like this, though continues to struggle in talking about mental health concerns with writer.    Client would benefit from additional opportunities to practice and implement content from this " session.      Is this a Weekly Reivewof the Progress on the Treatment Plan?  Yes    Are Treatment Plan Goals being addressed?  Yes, continue treatment goals    Are Treatment Plan Strategies to Address Goals Effective?  Yes, continue treatment strategies    Are there any current contracts in place?  No

## 2018-03-19 ENCOUNTER — HOSPITAL ENCOUNTER (OUTPATIENT)
Dept: BEHAVIORAL HEALTH | Facility: CLINIC | Age: 23
End: 2018-03-19
Attending: PSYCHIATRY & NEUROLOGY
Payer: COMMERCIAL

## 2018-03-19 PROCEDURE — H2012 BEHAV HLTH DAY TREAT, PER HR: HCPCS

## 2018-03-19 PROCEDURE — 97150 GROUP THERAPEUTIC PROCEDURES: CPT | Mod: GO

## 2018-03-19 NOTE — PROGRESS NOTES
"Adult Mental Health Outpatient Group Therapy Progress Note     Client Initial Individualized Goals for Treatment:  To be able to finish school and go to college and do my work there. To be able to keep up with my friendships and commitments to other people. To not want to die.\".       See Initial Treatment suggestions for the client during the time between Diagnostic Assessment and completion of the Master Individualized Treatment Plan.    Treatment Goals:  1.Client will notify staff when needing assistance to develop or implement a coping plan to manage suicidal or self injurious urges.Client will use coping plan for safety, as needed.  2. When in OT Agnes will report progress to plan and follow through with a list of planned daily activities and tasks providing an update of progress one time weekly.  3. Agnes will use time in group therapy to discuss what gives her life purpose and meaning.  4. Agnes will use time in group therapy to work on skills to build social connections and giving feedback to peers in group.  5. Agnes will set at least one weekly goal related to developing wellness related behaviors by increasing physical activity, Agnes has expressed interest in a sefl defense class and will start looking for a class near her.       Area of Treatment Focus:  Symptom Management, Personal Safety, Develop / Improve Independent Living Skills and Develop Socialization / Interpersonal Relationship Skills    Therapeutic Interventions/Treatment Strategies:  Support, Feedback, Safety Assessments, Problem Solving and Education    Response to Treatment Strategies:  Accepted Feedback, Gave Feedback, Listened, Focused on Goals, Attentive and Accepted Support    Name of Group:  Psychotherapy and Mental health Management     Description and Outcome:   Hour two (mental health management):  Showed group a video on co-occurring mental health and chemical health disorders.  Engaged in discussion around how " substance use and chemical health are related and can increase symptoms.  Discussed decreased effectiveness of medications related to substance use.  Client was asked to find one thing they resonated with in the video.  Client laughed and walked out of the room during the video when it discussed the helpfulness of prescribed medications.  She seemed to take issue with the idea that someone could never drink if they had mental health issues.  However, she was also able to identify how she resonated with one of the individuals from the film.      Client would benefit from additional opportunities to practice and implement content from this session by limiting substance use and using skills to cope with stress instead.    Hour three (psychotherapy):  Socorro reported her dinner was a success and she has been following through on making plans with friends.  Reinforced follow through and aided in identifying which skills have helped her to do this.  Connected to improved mood and fewer symptoms.  She reported urges to drink alcohol related to the video.  Provided skill suggestions to help her cope with urges. She appeared receptive to feedback and did not endorse safety concerns.    Client verbalized understanding of session content by stating plans to explore social group to transition to upon discharge from programming and to continue to reply to friends.      Is this a Weekly Review of the Progress on the Treatment Plan?  Yes.      Are Treatment Plan Goals being addressed?  Yes, continue treatment goals      Are Treatment Plan Strategies to Address Goals Effective?  Yes, continue treatment strategies      Are there any current contracts in place?  No

## 2018-03-20 NOTE — PROGRESS NOTES
"  Adult Mental Health Outpatient Group Therapy Progress Note     Client Initial Individualized Goals for Treatment: To be able to finish school and go to college and do my work there. To be able to keep up with my friendships and commitments to other people. To not want to die.\".    See Initial Treatment suggestions for the client during the time between Diagnostic Assessment and completion of the Master Individualized Treatment Plan.    Treatment Goals:     See above     Area of Treatment Focus:  Symptom Management    Therapeutic Interventions/Treatment Strategies:  Support, Feedback, Safety Assessments, Structured Activity and Education    Response to Treatment Strategies:  Accepted Feedback, Listened, Attentive, Accepted Support and Alert    Name of Group:   OT Skills    Description and Outcome:  Pt attended and participated in a structured occupational therapy group where intervention focused on coping through structured hands-on activities to improve function in valued roles, routines, and independent living skills.    Client was attentive to task during OT clinic (collage).  Generally quiet, though attentive to conversation around her, and when others asked questions to pull her into the conversation, she appeared comfortable with the interactions.    Client generally does not share many details, though reported having a good weekend at her rally/support dinner, and later went to a concert with friends at the [facility], which she enjoyed.    Client would benefit from additional opportunities to practice and implement content from this session.      Is this a Weekly Reivewof the Progress on the Treatment Plan?  No   "

## 2018-03-21 ENCOUNTER — HOSPITAL ENCOUNTER (OUTPATIENT)
Dept: BEHAVIORAL HEALTH | Facility: CLINIC | Age: 23
End: 2018-03-21
Attending: PSYCHIATRY & NEUROLOGY
Payer: COMMERCIAL

## 2018-03-21 PROCEDURE — H2012 BEHAV HLTH DAY TREAT, PER HR: HCPCS

## 2018-03-21 PROCEDURE — 97150 GROUP THERAPEUTIC PROCEDURES: CPT | Mod: GO

## 2018-03-21 NOTE — PROGRESS NOTES
"Adult Mental Health Outpatient Group Therapy Progress Note     Client Initial Individualized Goals for Treatment:  To be able to finish school and go to college and do my work there. To be able to keep up with my friendships and commitments to other people. To not want to die.\".       See Initial Treatment suggestions for the client during the time between Diagnostic Assessment and completion of the Master Individualized Treatment Plan.    Treatment Goals:  1.Client will notify staff when needing assistance to develop or implement a coping plan to manage suicidal or self injurious urges.Client will use coping plan for safety, as needed.  2. When in OT Agnes will report progress to plan and follow through with a list of planned daily activities and tasks providing an update of progress one time weekly.  3. Agnes will use time in group therapy to discuss what gives her life purpose and meaning.  4. Agnes will use time in group therapy to work on skills to build social connections and giving feedback to peers in group.  5. Agnes will set at least one weekly goal related to developing wellness related behaviors by increasing physical activity, Agnes has expressed interest in a sefl defense class and will start looking for a class near her.       Area of Treatment Focus:  Symptom Management, Personal Safety, Develop / Improve Independent Living Skills and Develop Socialization / Interpersonal Relationship Skills    Therapeutic Interventions/Treatment Strategies:  Support, Feedback, Safety Assessments, Problem Solving     Response to Treatment Strategies:  Accepted Feedback, Gave Feedback, Listened, Focused on Goals, Attentive and Accepted Support    Name of Group:  Psychotherapy    Description and Outcome:  Socorro reported feeling like she was getting physically ill.  She noted a women in her union  in [place] and she feels silly for being sad as she didn't know her well.  Challenged this thought and " validated sadness.  She reported feeling it was not productive to be sad and that she should channel this towards social justice movements.  Reinforced idea of channeling energy into this she finds meaningful but encouraged her to feel and express sadness equally productive.  She reported following through on plans from previous session to contact friends and noted positive feelings.  Reinforced skills used to follow through.  She appeared tearful and receptive to feedback.  She did not report safety concerns.    Client verbalized understanding of session content by stating plans to read her friend's article to attend to relationships further.      Is this a Weekly Review of the Progress on the Treatment Plan?  No

## 2018-03-21 NOTE — PROGRESS NOTES
"  Adult Mental Health Outpatient Group Therapy Progress Note     Client Initial Individualized Goals for Treatment: To be able to finish school and go to college and do my work there. To be able to keep up with my friendships and commitments to other people. To not want to die.\".    See Initial Treatment suggestions for the client during the time between Diagnostic Assessment and completion of the Master Individualized Treatment Plan.    Treatment Goals:     See above     Area of Treatment Focus:  Symptom Management    Therapeutic Interventions/Treatment Strategies:  Support, Feedback, Safety Assessments, Structured Activity and Education    Response to Treatment Strategies:  Accepted Feedback, Listened, Attentive, Accepted Support and Alert    Name of Group:   OT Skills    Description and Outcome:  Pt attended and participated in a structured occupational therapy group where intervention focused on coping through structured hands-on activities to improve function in valued roles, routines, and independent living skills.    Quiet in group, worked on the computer - appeared focused on what she was doing.  Peers were quite talkative, loud at times, and excitable about certain topics of conversation that didn't really seem of interest to client, which may have been why she was more withdrawn.    Client would benefit from additional opportunities to practice and implement content from this session.    Client demonstrated understanding of session content by completing the work on the computer she had set out to do.      Is this a Weekly Reivewof the Progress on the Treatment Plan?  No   "

## 2018-03-23 ENCOUNTER — TELEPHONE (OUTPATIENT)
Dept: BEHAVIORAL HEALTH | Facility: CLINIC | Age: 23
End: 2018-03-23

## 2018-03-23 NOTE — TELEPHONE ENCOUNTER
Called client to inquire as to absence from programming but received recorded message stating Client had a voicemail that was not set up yet and was unable to leave a message.

## 2018-03-26 ENCOUNTER — HOSPITAL ENCOUNTER (OUTPATIENT)
Dept: BEHAVIORAL HEALTH | Facility: CLINIC | Age: 23
End: 2018-03-26
Attending: PSYCHIATRY & NEUROLOGY
Payer: COMMERCIAL

## 2018-03-26 PROCEDURE — H2012 BEHAV HLTH DAY TREAT, PER HR: HCPCS

## 2018-03-26 PROCEDURE — 97150 GROUP THERAPEUTIC PROCEDURES: CPT | Mod: GO
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
## 2018-03-28 ENCOUNTER — HOSPITAL ENCOUNTER (OUTPATIENT)
Dept: BEHAVIORAL HEALTH | Facility: CLINIC | Age: 23
End: 2018-03-28
Attending: PSYCHIATRY & NEUROLOGY
Payer: COMMERCIAL

## 2018-03-28 PROCEDURE — H2012 BEHAV HLTH DAY TREAT, PER HR: HCPCS

## 2018-03-28 PROCEDURE — 97150 GROUP THERAPEUTIC PROCEDURES: CPT | Mod: GO

## 2018-03-29 NOTE — PROGRESS NOTES
"Adult Mental Health Outpatient Group Therapy Progress Note     Client Initial Individualized Goals for Treatment: To be able to finish school and go to college and do my work there. To be able to keep up with my friendships and commitments to other people. To not want to die.\".    See Initial Treatment suggestions for the client during the time between Diagnostic Assessment and completion of the Master Individualized Treatment Plan.    Treatment Goals:  1.Client will notify staff when needing assistance to develop or implement a coping plan to manage suicidal or self injurious urges.Client will use coping plan for safety, as needed.  2. When in OT Agnes will report progress to plan and follow through with a list of planned daily activities and tasks providing an update of progress one time weekly.  3. Agnes will use time in group therapy to discuss what gives her life purpose and meaning.  4. Agnes will use time in group therapy to work on skills to build social connections and giving feedback to peers in group.  5. Agnes will set at least one weekly goal related to developing wellness related behaviors by increasing physical activity, Agnes has expressed interest in a sefl defense class and will start looking for a class near her.     Area of Treatment Focus:  Symptom Management    Therapeutic Interventions/Treatment Strategies:  Support, Feedback, Safety Assessments, Structured Activity and Education    Response to Treatment Strategies:  Accepted Feedback, Listened, Attentive, Accepted Support and Alert    Name of Group:   OT Skills    Description and Outcome:  Client presented with calm,even mood.Good focus and concentration during a discussion related to assertive communication. She was given the opportunity to practice the assertion response which was difficult for her.Goal #1 (no personal safety concerns reported or observed). Goal #2 Not Addressed  Client would benefit from additional " opportunities to practice and implement content from this session in her everyday life and mental health recovery.    Is this a Weekly Review of the Progress on the Treatment Plan?  Yes.      Are Treatment Plan Goals being addressed?  Yes, continue treatment goals      Are Treatment Plan Strategies to Address Goals Effective?  Yes, continue treatment strategies      Are there any current contracts in place?  No

## 2018-03-30 ENCOUNTER — HOSPITAL ENCOUNTER (OUTPATIENT)
Dept: BEHAVIORAL HEALTH | Facility: CLINIC | Age: 23
End: 2018-03-30
Attending: PSYCHIATRY & NEUROLOGY
Payer: COMMERCIAL

## 2018-03-30 PROCEDURE — H2012 BEHAV HLTH DAY TREAT, PER HR: HCPCS

## 2018-03-30 PROCEDURE — 97150 GROUP THERAPEUTIC PROCEDURES: CPT | Mod: GO

## 2018-03-30 ASSESSMENT — ANXIETY QUESTIONNAIRES
5. BEING SO RESTLESS THAT IT IS HARD TO SIT STILL: NOT AT ALL
1. FEELING NERVOUS, ANXIOUS, OR ON EDGE: NOT AT ALL
7. FEELING AFRAID AS IF SOMETHING AWFUL MIGHT HAPPEN: SEVERAL DAYS
IF YOU CHECKED OFF ANY PROBLEMS ON THIS QUESTIONNAIRE, HOW DIFFICULT HAVE THESE PROBLEMS MADE IT FOR YOU TO DO YOUR WORK, TAKE CARE OF THINGS AT HOME, OR GET ALONG WITH OTHER PEOPLE: NOT DIFFICULT AT ALL
6. BECOMING EASILY ANNOYED OR IRRITABLE: NOT AT ALL
3. WORRYING TOO MUCH ABOUT DIFFERENT THINGS: NOT AT ALL
4. TROUBLE RELAXING: NOT AT ALL
2. NOT BEING ABLE TO STOP OR CONTROL WORRYING: NOT AT ALL
GAD7 TOTAL SCORE: 1

## 2018-03-30 NOTE — PROGRESS NOTES
"Adult Mental Health Outpatient Group Therapy Progress Note     Client Initial Individualized Goals for Treatment: To be able to finish school and go to college and do my work there. To be able to keep up with my friendships and commitments to other people. To not want to die.\".     See Initial Treatment suggestions for the client during the time between Diagnostic Assessment and completion of the Master Individualized Treatment Plan.     Treatment Goals:  1.Client will notify staff when needing assistance to develop or implement a coping plan to manage suicidal or self injurious urges.Client will use coping plan for safety, as needed.  2. When in OT Agnes will report progress to plan and follow through with a list of planned daily activities and tasks providing an update of progress one time weekly.  3. Agnes will use time in group therapy to discuss what gives her life purpose and meaning.  4. Agnes will use time in group therapy to work on skills to build social connections and giving feedback to peers in group.  5. Agnes will set at least one weekly goal related to developing wellness related behaviors by increasing physical activity, Agnes has expressed interest in a self defense class and will start looking for a class near her.     Area of Treatment Focus:  Symptom Management, Personal Safety, Community Resources/Discharge Planning and Develop / Improve Independent Living Skills    Therapeutic Interventions/Treatment Strategies:  Support, Feedback, Safety Assessments, Structured Activity and Clarification    Response to Treatment Strategies:  Gave Feedback, Listened, Focused on Goals, Accepted Support and Alert    Name of Group:  OT Clinic     Description and Outcome:  Socorro  attended and participated in a structured occupational therapy group where intervention focused on coping through structured hands-on activities.  Socorro worked in a self directed, goal oriented manner on her ongoing " collage.  She seemed to have adequate concentration and energy.  Mood was even.  She offered some feedback to peers about possible community support options but otherwise was quiet during group.  Client demonstrated understanding of session content by engaging in focused task work aimed at helping her psychiatric recovery.      Is this a Weekly Review of the Progress on the Treatment Plan?  No

## 2018-03-30 NOTE — PROGRESS NOTES
"Adult Mental Health Outpatient Group Therapy Progress Note     Client Initial Individualized Goals for Treatment: To be able to finish school and go to college and do my work there. To be able to keep up with my friendships and commitments to other people. To not want to die.\".    See Initial Treatment suggestions for the client during the time between Diagnostic Assessment and completion of the Master Individualized Treatment Plan.    Treatment Goals:  1.Client will notify staff when needing assistance to develop or implement a coping plan to manage suicidal or self injurious urges.Client will use coping plan for safety, as needed.  2. When in OT Agnes will report progress to plan and follow through with a list of planned daily activities and tasks providing an update of progress one time weekly.  3. Agnes will use time in group therapy to discuss what gives her life purpose and meaning.  4. Agnes will use time in group therapy to work on skills to build social connections and giving feedback to peers in group.  5. Agnes will set at least one weekly goal related to developing wellness related behaviors by increasing physical activity, Agnes has expressed interest in a sefl defense class and will start looking for a class near her.     Area of Treatment Focus:  Symptom Management, Develop / Improve Independent Living Skills and Develop Socialization / Interpersonal Relationship Skills    Therapeutic Interventions/Treatment Strategies:  Support, Feedback, Problem Solving    Response to Treatment Strategies:  Accepted Feedback, Gave Feedback, Listened, Focused on Goals, Attentive and Accepted Support    Name of Group:  Psychotherapy     Description and Outcome:  Socorro reported she did not attend social group \"hearing voices\" as she deciding to puruse another group instead which she has an intake for tomorrow.  She reported attending to her relationship with her partner as per her goal to show more " love.  Highlighted positive feelings associated with connecting with loved one.  She reported not communicating with friend or mother recently.  Problem solved barriers to attending to these relationships.  She appeared receptive to feedback and did not endorse safety concerns.    Client verbalized understanding of session content by stating plans to attend intake for new group and call her mom.      Is this a Weekly Review of the Progress on the Treatment Plan?  No

## 2018-03-30 NOTE — DISCHARGE SUMMARY
Adult Mental Health Intensive Outpatient Discharge Summary/Instructions      Patient: Agnes Woods MRN: [id number]   : 1995 Age: 22 year old Sex: female     Admission Date:  18  Discharge Date:  3/30/18  Diagnosis: 296.33 (F33.2) Major Depressive Disorder, Recurrent Episode, Severe _    Focus of Treatment / Progress    Personal Safety: Client denied safety concerns at time of discharge.       * Follow your safety plan     * Call crisis lines as needed:    [facility] [phone] [facility] [phone]  [facility] [phone] Crisis Connection [phone]  [facility] [phone] [facility] COPE [phone]  [facility] [phone] National Suicide Prevention [phone]  [facility] [phone] Suicide Prevention [phone]  [facility] [phone]    Managing symptoms of:  Depresssion, Anxiety    Community support/health:  Client plans to start other outpatient mental health programming on .      Managing Symptoms and Preventing Relapse    * Go to all of your appointments    * Take all medications as directed.      * Carry a current list if medication with you    * Do not use illicit (street) drugs.  Avoid alcohol    * Report these symptoms to your care team. These are early signs of relapse:   Thoughts of suicide   Losing more sleep or a lot of daytime sleeping   Increased confusion   Mood getting worse   Feeling more aggressive   Other:    Isolating/withdrawing    *Use these skills daily:  Mindfulness    Copy of summary sent to: Available in EPIC    Follow up with psychiatrist / main caregiver: Socorro's PCP Prescribes her medications. Her PCP is Dr. Christie Asif at [facility]  Next visit: No follow up appt scheduled, staff encouraged Socorro to schedule a f/u appt with her provider.     Follow up with your therapist: Jose Lee  Next visit: Week of     Go to group therapy and / or support groups at:   [facility]   [facility] [place] [email or web address]  [place] [facility] [phone]  24 hours helpline    [place]  [phone]      Resources on [facility] and the [facility]: [place] [facility] [phone]  Or Low-cost medical services at [facility], Erin perez ($5 co-pay for uninsured [facility] students)        See your medical doctor about: Please see Dr. Christie Asif at [facility] for all medical issues and any medical concerns that arise.    Other:  Your team appreciates the opportunity of working with you and wishes you the best, please feel free to reach out to us in the future with any questions or if you feel you need to return to the program [phone].    Client Signature:_______________________  Date / Time:___________  Staff Signature:________________________   Date / Time:___________
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
## 2018-03-30 NOTE — PROGRESS NOTES
"Adult Mental Health Outpatient Group Therapy Progress Note     Client Initial Individualized Goals for Treatment: To be able to finish school and go to college and do my work there. To be able to keep up with my friendships and commitments to other people. To not want to die.\".    See Initial Treatment suggestions for the client during the time between Diagnostic Assessment and completion of the Master Individualized Treatment Plan.    Treatment Goals:  1.Client will notify staff when needing assistance to develop or implement a coping plan to manage suicidal or self injurious urges.Client will use coping plan for safety, as needed.  2. When in OT Agnes will report progress to plan and follow through with a list of planned daily activities and tasks providing an update of progress one time weekly.  3. Agnes will use time in group therapy to discuss what gives her life purpose and meaning.  4. Agnes will use time in group therapy to work on skills to build social connections and giving feedback to peers in group.  5. Agnes will set at least one weekly goal related to developing wellness related behaviors by increasing physical activity, Agnes has expressed interest in a sefl defense class and will start looking for a class near her.     Area of Treatment Focus:  Symptom Management    Therapeutic Interventions/Treatment Strategies:  Support, Feedback, Safety Assessments, Structured Activity and Education    Response to Treatment Strategies:  Accepted Feedback, Listened, Attentive, Accepted Support and Alert    Name of Group:   OT Skills    Description and Outcome:  Client presented with calm,even mood.Good focus and concentration while working on a structured task. Minimal social interaction. Client reported that she was discharging today and reported feeling stable and benefited form being in this program. When asked she reports that she is able to write better with her right hand but still " experiences some pain at times.Goal #1 (no personal safety concerns reported or observed). Goal #2 Overall client reports that she feels more organized.  Client would benefit from additional opportunities to practice and implement content from this session in her everyday life and mental health recovery.    Is this a Weekly Review of the Progress on the Treatment Plan?  Yes.      Are Treatment Plan Goals being addressed?  Yes, client discharged      Are Treatment Plan Strategies to Address Goals Effective?  Yes, client discharged      Are there any current contracts in place?  No

## 2018-03-31 ASSESSMENT — ANXIETY QUESTIONNAIRES: GAD7 TOTAL SCORE: 1

## 2018-03-31 ASSESSMENT — PATIENT HEALTH QUESTIONNAIRE - PHQ9: SUM OF ALL RESPONSES TO PHQ QUESTIONS 1-9: 7

## 2018-06-08 ENCOUNTER — TELEPHONE (OUTPATIENT)
Dept: FAMILY MEDICINE | Facility: CLINIC | Age: 23
End: 2018-06-08

## 2018-06-08 DIAGNOSIS — Z30.011 ENCOUNTER FOR INITIAL PRESCRIPTION OF CONTRACEPTIVE PILLS: ICD-10-CM

## 2018-06-08 RX ORDER — NORETHINDRONE ACETATE AND ETHINYL ESTRADIOL .03; 1.5 MG/1; MG/1
1 TABLET ORAL DAILY
Qty: 30 TABLET | Refills: 0 | Status: SHIPPED | OUTPATIENT
Start: 2018-06-08 | End: 2018-07-11

## 2018-06-08 NOTE — TELEPHONE ENCOUNTER
Medication is being filled for 1 time refill only due to:  due for a yearly physical    Lauren Burgos RN

## 2018-06-08 NOTE — TELEPHONE ENCOUNTER
Reason for Call:  Medication or medication refill:    Do you use a [facility]?  Name of the pharmacy and phone number for the current request:  [facility] - [phone]    Name of the medication requested: norethindrone-ethinyl estradiol (MICROGESTIN 1.5/30) 1.5-30 MG-MCG per tablet    Other request: Patient is requesting for a refill today if possible. She is completely out. Please advise, thank you!    Can we leave a detailed message on this number? YES    Phone number patient can be reached at: Home number on file [phone] (home)    Best Time: anytime    Call taken on 6/8/2018 at 11:11 AM by Swapna Cornell

## 2018-07-11 ENCOUNTER — OFFICE VISIT (OUTPATIENT)
Dept: FAMILY MEDICINE | Facility: CLINIC | Age: 23
End: 2018-07-11
Payer: COMMERCIAL

## 2018-07-11 VITALS
DIASTOLIC BLOOD PRESSURE: 68 MMHG | OXYGEN SATURATION: 96 % | SYSTOLIC BLOOD PRESSURE: 116 MMHG | HEART RATE: 100 BPM | BODY MASS INDEX: 22.08 KG/M2 | WEIGHT: 141 LBS | RESPIRATION RATE: 16 BRPM | TEMPERATURE: 98.1 F

## 2018-07-11 DIAGNOSIS — Z71.6 TOBACCO ABUSE COUNSELING: ICD-10-CM

## 2018-07-11 DIAGNOSIS — Z01.419 ENCOUNTER FOR GYNECOLOGICAL EXAMINATION WITHOUT ABNORMAL FINDING: Primary | ICD-10-CM

## 2018-07-11 DIAGNOSIS — Z30.011 ENCOUNTER FOR INITIAL PRESCRIPTION OF CONTRACEPTIVE PILLS: ICD-10-CM

## 2018-07-11 PROCEDURE — 99395 PREV VISIT EST AGE 18-39: CPT | Performed by: NURSE PRACTITIONER

## 2018-07-11 PROCEDURE — 87591 N.GONORRHOEAE DNA AMP PROB: CPT | Performed by: NURSE PRACTITIONER

## 2018-07-11 PROCEDURE — 87491 CHLMYD TRACH DNA AMP PROBE: CPT | Performed by: NURSE PRACTITIONER

## 2018-07-11 PROCEDURE — G0145 SCR C/V CYTO,THINLAYER,RESCR: HCPCS | Performed by: NURSE PRACTITIONER

## 2018-07-11 RX ORDER — NORETHINDRONE ACETATE AND ETHINYL ESTRADIOL .03; 1.5 MG/1; MG/1
1 TABLET ORAL DAILY
Qty: 84 TABLET | Refills: 3 | Status: SHIPPED | OUTPATIENT
Start: 2018-07-11 | End: 2019-06-16

## 2018-07-11 NOTE — LETTER
July 13, 2018      Agnes Woods  [address]  [place] [address]    Dear ,      I am happy to inform you that your recent cervical cancer screening test (PAP smear) was normal.      Preventative screenings such as this help to ensure your health for years to come. You should repeat a pap smear in 3 years, unless otherwise directed.      You will still need to return to the clinic every year for your annual exam and other preventive tests.     Please contact the clinic at [phone] if you have further questions.       Sincerely,      Suzie De Paz, BESSY CNP/esh

## 2018-07-11 NOTE — MR AVS SNAPSHOT
After Visit Summary   7/11/2018    Agnes Woods    MRN: [id number]           Patient Information     Date Of Birth          1995        Visit Information        Provider Department      7/11/2018 11:00 AM Suzie De Paz APRN [facility]        Today's Diagnoses     Encounter for gynecological examination without abnormal finding    -  1    Encounter for initial prescription of contraceptive pills        Tobacco abuse counseling          Care Instructions      Preventive Health Recommendations  Female Ages 21 to 25     Yearly exam:     See your health care provider every year in order to  o Review health changes.   o Discuss preventive care.    o Review your medicines if your doctor has prescribed any.      You should be tested each year for STDs (sexually transmitted diseases).       Talk to your provider about how often you should have cholesterol testing.      Get a Pap test every three years. If you have an abnormal result, your doctor may have you test more often.      If you are at risk for diabetes, you should have a diabetes test (fasting glucose).     Shots:     Get a flu shot each year.     Get a tetanus shot every 10 years.     Consider getting the shot (vaccine) that prevents cervical cancer (Gardasil).    Nutrition:     Eat at least 5 servings of fruits and vegetables each day.    Eat whole-grain bread, whole-wheat pasta and brown rice instead of white grains and rice.    Get adequate Calcium and Vitamin D.     Lifestyle    Exercise at least 150 minutes a week each week (30 minutes a day, 5 days a week). This will help you control your weight and prevent disease.    Limit alcohol to one drink per day.    No smoking.     Wear sunscreen to prevent skin cancer.    See your dentist every six months for an exam and cleaning.          Follow-ups after your visit        Who to contact     If you have questions or need follow up information about today's  clinic visit or your schedule please contact [facility] directly at [phone].  Normal or non-critical lab and imaging results will be communicated to you by MyChart, letter or phone within 4 business days after the clinic has received the results. If you do not hear from us within 7 days, please contact the clinic through [employer] or phone. If you have a critical or abnormal lab result, we will notify you by phone as soon as possible.  Submit refill requests through [employer] or call your pharmacy and they will forward the refill request to us. Please allow 3 business days for your refill to be completed.          Additional Information About Your Visit        [email or web address] Information     [employer] gives you secure access to your electronic health record. If you see a primary care provider, you can also send messages to your care team and make appointments. If you have questions, please call your primary care clinic.  If you do not have a primary care provider, please call [phone] and they will assist you.        Care EveryWhere ID     This is your Care EveryWhere ID. This could be used by other organizations to access your [facility] medical records  [id number]        Your Vitals Were     Pulse Temperature Respirations Last Period Pulse Oximetry BMI (Body Mass Index)    100 98.1  F (36.7  C) (Oral) 16 07/04/2018 (Exact Date) 96% 22.08 kg/m2       Blood Pressure from Last 3 Encounters:   07/11/18 116/68   01/02/18 116/80   12/28/17 120/82    Weight from Last 3 Encounters:   07/11/18 141 lb (64 kg)   01/02/18 144 lb (65.3 kg)   12/28/17 142 lb (64.4 kg)              We Performed the Following     CHLAMYDIA TRACHOMATIS PCR     NEISSERIA GONORRHOEA PCR     Pap imaged thin layer screen only - recommended age 21 - 24 years          Where to get your medicines      These medications were sent to [facility]  [address]  [address], [place] [address]      Phone:  [phone]     norethindrone-ethinyl estradiol 1.5-30 MG-MCG per tablet          Primary Care Provider Office Phone # Fax #    Selina Christie Asif [phone] [address] [place] [address]        Equal Access to Services     NADIRAYUDELKA ENEDNIA : Hadii aad ku hadasho Soomaali, waaxda luqadaha, qaybta kaalmada adeegyada, waxay marcein hayaan adeeg nataliedaniajayme lasalome . So [facility] [phone].    ATENCIÓN: Si habla español, tiene a cid disposición servicios gratuitos de asistencia lingüística. Llame al [phone].    We comply with applicable federal civil rights laws and [place] laws. We do not discriminate on the basis of race, color, national origin, age, disability, sex, sexual orientation, or gender identity.            Thank you!     Thank you for choosing [facility]  for your care. Our goal is always to provide you with excellent care. Hearing back from our patients is one way we can continue to improve our services. Please take a few minutes to complete the written survey that you may receive in the mail after your visit with us. Thank you!             Your Updated Medication List - Protect others around you: Learn how to safely use, store and throw away your medicines at www.disposemymeds.org.          This list is accurate as of 7/11/18 12:59 PM.  Always use your most recent med list.                   Brand Name Dispense Instructions for use Diagnosis    Caffeine 100 MG Tabs      Take 200 mg by mouth every morning        DRISDOL 73978 units Caps     4 capsule    Take 50,000 Units by mouth every 7 days    Vitamin D deficiency       hydrOXYzine 25 MG tablet    ATARAX    90 tablet    Take 1-2 tablets (25-50 mg) by mouth every 4 hours as needed for anxiety    Anxiety       lamoTRIgine 25 MG tablet    LaMICtal    60 tablet    Take 1 tablet (25 mg) by mouth daily    Suicide and self-inflicted injury by cutting and piercing instrument, initial  encounter (H)       norethindrone-ethinyl estradiol 1.5-30 MG-MCG per tablet    MICROGESTIN 1.5/30    84 tablet    Take 1 tablet by mouth daily    Encounter for initial prescription of contraceptive pills       venlafaxine 75 MG Tb24 24 hr tablet    EFFEXOR-ER    30 each    Take 1 tablet (75 mg) by mouth daily (with breakfast)    Anxiety, Moderate episode of recurrent major depressive disorder (H)

## 2018-07-11 NOTE — PROGRESS NOTES
SUBJECTIVE:   Agnes Woods is a 23 year old female who presents to clinic today for the following health issues:
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
## 2018-07-12 LAB
C TRACH DNA SPEC QL NAA+PROBE: NEGATIVE
N GONORRHOEA DNA SPEC QL NAA+PROBE: NEGATIVE
SPECIMEN SOURCE: NORMAL
SPECIMEN SOURCE: NORMAL

## 2018-07-13 LAB
COPATH REPORT: NORMAL
PAP: NORMAL

## 2018-09-05 NOTE — ADDENDUM NOTE
Encounter addended by: Lesli Arce [facility] on: 9/5/2018  2:36 PM<BR>     Actions taken: Episode resolved

## 2018-10-03 ENCOUNTER — ALLIED HEALTH/NURSE VISIT (OUTPATIENT)
Dept: NURSING | Facility: CLINIC | Age: 23
End: 2018-10-03
Payer: COMMERCIAL

## 2018-10-03 DIAGNOSIS — Z23 NEED FOR PROPHYLACTIC VACCINATION AGAINST TUBERCULOSIS USING BCG VACCINE: Primary | ICD-10-CM

## 2018-10-03 PROCEDURE — 99207 ZZC NO CHARGE NURSE ONLY: CPT

## 2018-10-03 PROCEDURE — 86580 TB INTRADERMAL TEST: CPT

## 2018-10-03 ASSESSMENT — ANXIETY QUESTIONNAIRES
IF YOU CHECKED OFF ANY PROBLEMS ON THIS QUESTIONNAIRE, HOW DIFFICULT HAVE THESE PROBLEMS MADE IT FOR YOU TO DO YOUR WORK, TAKE CARE OF THINGS AT HOME, OR GET ALONG WITH OTHER PEOPLE: SOMEWHAT DIFFICULT
3. WORRYING TOO MUCH ABOUT DIFFERENT THINGS: NOT AT ALL
5. BEING SO RESTLESS THAT IT IS HARD TO SIT STILL: SEVERAL DAYS
GAD7 TOTAL SCORE: 7
7. FEELING AFRAID AS IF SOMETHING AWFUL MIGHT HAPPEN: MORE THAN HALF THE DAYS
2. NOT BEING ABLE TO STOP OR CONTROL WORRYING: NOT AT ALL
6. BECOMING EASILY ANNOYED OR IRRITABLE: MORE THAN HALF THE DAYS
1. FEELING NERVOUS, ANXIOUS, OR ON EDGE: MORE THAN HALF THE DAYS

## 2018-10-03 ASSESSMENT — PATIENT HEALTH QUESTIONNAIRE - PHQ9: 5. POOR APPETITE OR OVEREATING: NOT AT ALL

## 2018-10-03 NOTE — NURSING NOTE
The patient is asked the following questions today and these are her answers:    -Have you had a mantoux administered in the past 30 days?    No  -Have you had a previous positive Mantoux.  No  -Have you received BCG in the past.  No  -Have you had a live vaccine  (MMR, Varicella, OPV, Yellow Fever) in the last 6 weeks.  No  -Have you had and active  viral or bacterial infection in the past 6 weeks.  No  -Have you received corticosteroids or immunosuppressive agents in the past 6 weeks.  No  -Have you been diagnosed with HIV?  No  -Do you have a maglinancy?  No     Asuncion Esparza MA

## 2018-10-03 NOTE — MR AVS SNAPSHOT
After Visit Summary   10/3/2018    Agnes Woods    MRN: [id number]           Patient Information     Date Of Birth          1995        Visit Information        Provider Department      10/3/2018 9:00 AM HP MEDICAL ASSISTANT [facility]        Today's Diagnoses     Need for prophylactic vaccination against tuberculosis using BCG vaccine    -  1       Follow-ups after your visit        Your next 10 appointments already scheduled     Oct 05, 2018  9:00 AM CDT   Nurse Only with HP RN/TRIAGE NURSE ONLY   [facility] ([facility])    [address] [place] [address] [phone] [phone]              Who to contact     If you have questions or need follow up information about today's clinic visit or your schedule please contact [facility] directly at [phone].  Normal or non-critical lab and imaging results will be communicated to you by [email or web address], letter or phone within 4 business days after the clinic has received the results. If you do not hear from us within 7 days, please contact the clinic through [email or web address] or phone. If you have a critical or abnormal lab result, we will notify you by phone as soon as possible.  Submit refill requests through SweetLabs or call your pharmacy and they will forward the refill request to us. Please allow 3 business days for your refill to be completed.          Additional Information About Your Visit        MyChart Information     [employer] gives you secure access to your electronic health record. If you see a primary care provider, you can also send messages to your care team and make appointments. If you have questions, please call your primary care clinic.  If you do not have a primary care provider, please call [phone] and they will assist you.        Care EveryWhere ID     This is your Care EveryWhere ID. This could be used by other organizations to access your [facility]  records  [id number]         Blood Pressure from Last 3 Encounters:   07/11/18 116/68   01/02/18 116/80   12/28/17 120/82    Weight from Last 3 Encounters:   07/11/18 141 lb (64 kg)   01/02/18 144 lb (65.3 kg)   12/28/17 142 lb (64.4 kg)              We Performed the Following     TB INTRADERMAL TEST        Primary Care Provider Office Phone # Fax #    Selina Christie Asif [phone] [address] [place] [address]        Equal Access to Services     [facility]EN[facility] : Hadii aad ku hadasho Soomaali, waaxda luqadaha, qaybta kaalmada adeegyada, neptali zhengin hayaan adeeg zuri mathur . So [facility] [phone].    ATENCIÓN: Si habla español, tiene a cid disposición servicios gratuitos de asistencia lingüística. Llame al [phone].    We comply with applicable federal civil rights laws and [place] laws. We do not discriminate on the basis of race, color, national origin, age, disability, sex, sexual orientation, or gender identity.            Thank you!     Thank you for choosing [facility]  for your care. Our goal is always to provide you with excellent care. Hearing back from our patients is one way we can continue to improve our services. Please take a few minutes to complete the written survey that you may receive in the mail after your visit with us. Thank you!             Your Updated Medication List - Protect others around you: Learn how to safely use, store and throw away your medicines at www.disposemymeds.org.          This list is accurate as of 10/3/18  9:19 AM.  Always use your most recent med list.                   Brand Name Dispense Instructions for use Diagnosis    Caffeine 100 MG Tabs      Take 200 mg by mouth every morning        DRISDOL 99083 units Caps     4 capsule    Take 50,000 Units by mouth every 7 days    Vitamin D deficiency       hydrOXYzine 25 MG tablet    ATARAX    90 tablet    Take 1-2 tablets (25-50 mg) by mouth every 4  hours as needed for anxiety    Anxiety       lamoTRIgine 25 MG tablet    LaMICtal    60 tablet    Take 1 tablet (25 mg) by mouth daily    Suicide and self-inflicted injury by cutting and piercing instrument, initial encounter (H)       norethindrone-ethinyl estradiol 1.5-30 MG-MCG per tablet    MICROGESTIN 1.5/30    84 tablet    Take 1 tablet by mouth daily    Encounter for initial prescription of contraceptive pills       venlafaxine 75 MG Tb24 24 hr tablet    EFFEXOR-ER    30 each    Take 1 tablet (75 mg) by mouth daily (with breakfast)    Anxiety, Moderate episode of recurrent major depressive disorder (H)

## 2018-10-03 NOTE — PROGRESS NOTES
Dropped off completed PHQ9.    PHQ9 score: 9  Positive on Question #9.    PCP please advise.      Asuncion Esparza MA

## 2018-10-04 ASSESSMENT — PATIENT HEALTH QUESTIONNAIRE - PHQ9: SUM OF ALL RESPONSES TO PHQ QUESTIONS 1-9: 9

## 2018-10-04 ASSESSMENT — ANXIETY QUESTIONNAIRES: GAD7 TOTAL SCORE: 7

## 2018-10-05 ENCOUNTER — ALLIED HEALTH/NURSE VISIT (OUTPATIENT)
Dept: NURSING | Facility: CLINIC | Age: 23
End: 2018-10-05
Payer: COMMERCIAL

## 2018-10-05 ENCOUNTER — RADIANT APPOINTMENT (OUTPATIENT)
Dept: GENERAL RADIOLOGY | Facility: CLINIC | Age: 23
End: 2018-10-05
Attending: FAMILY MEDICINE
Payer: COMMERCIAL

## 2018-10-05 DIAGNOSIS — R76.11 MANTOUX: POSITIVE: Primary | ICD-10-CM

## 2018-10-05 LAB
PPDINDURATION: 11 MM (ref 0–5)
PPDREDNESS: ABNORMAL MM

## 2018-10-05 PROCEDURE — 36415 COLL VENOUS BLD VENIPUNCTURE: CPT | Performed by: FAMILY MEDICINE

## 2018-10-05 PROCEDURE — 71046 X-RAY EXAM CHEST 2 VIEWS: CPT

## 2018-10-05 PROCEDURE — 99207 ZZC NO CHARGE NURSE ONLY: CPT

## 2018-10-05 PROCEDURE — 86480 TB TEST CELL IMMUN MEASURE: CPT | Performed by: FAMILY MEDICINE

## 2018-10-05 NOTE — PROGRESS NOTES
Mantoux result:  Lab Results   Component Value Date    PPDINDURATIO 11 10/05/2018     Chest x ray and Quantiferon gold test ordered per Dr. Asif.    Dina Vera RN

## 2018-10-05 NOTE — MR AVS SNAPSHOT
After Visit Summary   10/5/2018    Agnes Woods    MRN: [id number]           Patient Information     Date Of Birth          1995        Visit Information        Provider Department      10/5/2018 9:00 AM HP RN/TRIAGE NURSE ONLY [facility]        Today's Diagnoses     Mantoux: positive    -  1       Follow-ups after your visit        Who to contact     If you have questions or need follow up information about today's clinic visit or your schedule please contact [facility] directly at [phone].  Normal or non-critical lab and imaging results will be communicated to you by dotloophart, letter or phone within 4 business days after the clinic has received the results. If you do not hear from us within 7 days, please contact the clinic through [employer] or phone. If you have a critical or abnormal lab result, we will notify you by phone as soon as possible.  Submit refill requests through Netcontinuum or call your pharmacy and they will forward the refill request to us. Please allow 3 business days for your refill to be completed.          Additional Information About Your Visit        MyChart Information     Net[employer] gives you secure access to your electronic health record. If you see a primary care provider, you can also send messages to your care team and make appointments. If you have questions, please call your primary care clinic.  If you do not have a primary care provider, please call [phone] and they will assist you.        Care EveryWhere ID     This is your Care EveryWhere ID. This could be used by other organizations to access your [facility] medical records  [id number]         Blood Pressure from Last 3 Encounters:   07/11/18 116/68   01/02/18 116/80   12/28/17 120/82    Weight from Last 3 Encounters:   07/11/18 141 lb (64 kg)   01/02/18 144 lb (65.3 kg)   12/28/17 142 lb (64.4 kg)              We Performed the Following     M Tuberculosis by  Quantiferon     XR Chest 2 Views        Primary Care Provider Office Phone # Fax #    Selina Christie Asif [phone] [address] [place] [address]        Equal Access to Services     NAT MCCONNELL : Hadii aad ku hadprasado Soomaali, waaxda luqadaha, qaybta kaalmada adeegyada, waxsergei idiin hayskylarn adethaddeus keating kevan rocha. So [facility] [phone].    ATENCIÓN: Si habla español, tiene a cid disposición servicios gratuitos de asistencia lingüística. Llame al [phone].    We comply with applicable federal civil rights laws and [place] laws. We do not discriminate on the basis of race, color, national origin, age, disability, sex, sexual orientation, or gender identity.            Thank you!     Thank you for choosing [facility]  for your care. Our goal is always to provide you with excellent care. Hearing back from our patients is one way we can continue to improve our services. Please take a few minutes to complete the written survey that you may receive in the mail after your visit with us. Thank you!             Your Updated Medication List - Protect others around you: Learn how to safely use, store and throw away your medicines at www.disposemymeds.org.          This list is accurate as of 10/5/18  9:35 AM.  Always use your most recent med list.                   Brand Name Dispense Instructions for use Diagnosis    Caffeine 100 MG Tabs      Take 200 mg by mouth every morning        DRISDOL 46253 units Caps     4 capsule    Take 50,000 Units by mouth every 7 days    Vitamin D deficiency       hydrOXYzine 25 MG tablet    ATARAX    90 tablet    Take 1-2 tablets (25-50 mg) by mouth every 4 hours as needed for anxiety    Anxiety       lamoTRIgine 25 MG tablet    LaMICtal    60 tablet    Take 1 tablet (25 mg) by mouth daily    Suicide and self-inflicted injury by cutting and piercing instrument, initial encounter (H)       norethindrone-ethinyl estradiol  1.5-30 MG-MCG per tablet    MICROGESTIN 1.5/30    84 tablet    Take 1 tablet by mouth daily    Encounter for initial prescription of contraceptive pills       venlafaxine 75 MG Tb24 24 hr tablet    EFFEXOR-ER    30 each    Take 1 tablet (75 mg) by mouth daily (with breakfast)    Anxiety, Moderate episode of recurrent major depressive disorder (H)

## 2018-10-08 LAB
GAMMA INTERFERON BACKGROUND BLD IA-ACNC: 0.05 IU/ML
M TB IFN-G BLD-IMP: NEGATIVE
M TB IFN-G CD4+ BCKGRND COR BLD-ACNC: >10 IU/ML
MITOGEN IGNF BCKGRD COR BLD-ACNC: 0 IU/ML
MITOGEN IGNF BCKGRD COR BLD-ACNC: 0.02 IU/ML

## 2019-05-30 ENCOUNTER — OFFICE VISIT (OUTPATIENT)
Dept: FAMILY MEDICINE | Facility: CLINIC | Age: 24
End: 2019-05-30
Payer: COMMERCIAL

## 2019-05-30 VITALS
HEART RATE: 65 BPM | BODY MASS INDEX: 22.87 KG/M2 | TEMPERATURE: 98.6 F | WEIGHT: 146 LBS | DIASTOLIC BLOOD PRESSURE: 76 MMHG | SYSTOLIC BLOOD PRESSURE: 122 MMHG | OXYGEN SATURATION: 99 %

## 2019-05-30 DIAGNOSIS — R20.2 RIGHT HAND PARESTHESIA: ICD-10-CM

## 2019-05-30 DIAGNOSIS — H61.23 EXCESSIVE CERUMEN IN BOTH EAR CANALS: Primary | ICD-10-CM

## 2019-05-30 PROCEDURE — 99213 OFFICE O/P EST LOW 20 MIN: CPT | Performed by: NURSE PRACTITIONER

## 2019-05-30 NOTE — PROGRESS NOTES
Subjective     Agnes Woods is a 24 year old female who presents to clinic today for the following health issues:    HPI   Acute hearing loss in left ear   Acute illness concerns: Hearing  Onset: this morning     Fever: no    Chills/Sweats: no    Headache (location?): no    Sinus Pressure:no    Conjunctivitis:  no    Ear Pain: YES- pressure     Rhinorrhea: no    Congestion: YES- a little this morning     Sore Throat: no     Cough: no    Wheeze: no    Decreased Appetite: no    Nausea: no    Vomiting: no    Diarrhea:  no    Dysuria/Freq.: no    Fatigue/Achiness: no     Sick/Strep Exposure: no     Therapies Tried and outcome: OTC drops but didn't help.   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  Went to hand therapy, but never saw nerve specialist.  Was told 6-12 months healing, but has not resolved.  Has improved approximately 60 percent.  Continues to cause problems with writing, typing, piano - pain, numbness of the thumb and first and second finger.  Major is sound arts and these are critical.    Reviewed and updated as needed this visit by Provider         Review of Systems   ROS COMP: Constitutional, HEENT, pulmonary, gi, msk and neuro systems are negative, except as otherwise noted.      Objective    /76   Pulse 65   Temp 98.6  F (37  C) (Oral)   Wt 66.2 kg (146 lb)   SpO2 99%   BMI 22.87 kg/m    Body mass index is 22.87 kg/m .  Physical Exam   GENERAL: healthy, alert and no distress  EYES: Eyes grossly normal to inspection, PERRL and conjunctivae and sclerae normal  HENT: ear canals and TM's normal, nose and mouth without ulcers or lesions  NECK: no adenopathy, no asymmetry, masses, or scars and thyroid normal to palpation  MS: no gross musculoskeletal defects noted, no edema  SKIN: no suspicious lesions or rashes  NEURO: Normal strength and tone, mentation intact and speech normal            Assessment & Plan     (H61.23) Excessive cerumen in both ear canals  (primary encounter  diagnosis)  Comment:   Plan: Earwash by MA.  Reviewed can use debrox or liquid colace to prevent build up    (R20.2) Right hand paresthesia  Comment:   Plan: NEUROLOGY ADULT REFERRAL        S/p self-injury to nerve of arm without resolution beyond timing she was told to expect.  Schedule EMG       Tobacco Cessation:   reports that she has been smoking.  She has been smoking about 0.25 packs per day. She has never used smokeless tobacco.  Tobacco Cessation Action Plan: Information offered: Patient not interested at this time          Patient Instructions   Use drops either Debrox or liquid Colace    Schedule EMG      Earwax, Home Treatment    Everyone produces earwax from the lining of the ear canal. It serves to lubricate and protect the ear. The wax that forms in the canal naturally moves toward the outside of the ear and falls out. Sometimes the ear canal may contain too much wax. This can cause a blockage and loss of hearing. Directions are given below for home treatment.  Home care  If your doctor has advised you to remove a wax blockage yourself, follow these directions:    Unless a medicine was prescribed, you may use an over-the-counter product made for clearing earwax. These contain carbamide peroxide. Lie down with the blocked ear facing upward. Apply one dropper full of medicine and wait a few minutes. Grasp the outer ear and wiggle it to help the solution enter the canal.    Lean over a sink or basin with the blocked ear facing downward. Use a bulb syringe filled with warm (not hot or cold) water to rinse the ear several times. Use gentle pressure only.    If you are having trouble draining the water out of your ear canal, put a few drops of rubbing alcohol (isopropyl alcohol) into the ear canal. This will help remove the remaining water.    Repeat this procedure once a day for up to three days, or until your hearing is back to normal. Do not use this treatment for more than three days in a  row.  Don ts    Don t use cold water to rinse the ear. This will make you dizzy.    Don t perform this procedure if you have an ear infection.    Don t perform this procedure if you have a ruptured eardrum.    Don t use cotton swabs, matches, hairpins, keys, or other objects to  clean  the ear canal. This can cause infection of the ear canal or rupture the eardrum. Because of their size and shape, cotton swabs can push earwax deeper into the ear canal instead of removing it.  Follow-up care  Follow up with your health care provider if you are not improving after three cleaning attempts, or as advised.  When to seek medical advice  Call your health care provider right away if any of these occur:    Worsening ear pain    Fever of 101 F (38.3 C) or higher, or as directed by your health care provider    Hearing does not return to normal after three days of treatment    Fluid drainage or bleeding from the ear canal    Swelling, redness, or tenderness of the outer ear    Headache, neck pain, or stiff neck    2213-1088 [employer]. [address] [place] [address], [place] [id number]. All rights reserved. This information is not intended as a substitute for professional medical care. Always follow your healthcare professional's instructions.               No follow-ups on file.    BESSY Lester [facility]

## 2019-05-30 NOTE — NURSING NOTE
PROCEDURE  Ear exam showing wax occlusion in  both ears.    Hydrogen peroxide and warm water  was placed in both ear(s) and let soak for 1 minutes.  The patients ear(s) were irrigated using an elephant ear wash system. Patient tolerated procedure well.  Ana Cristina Nugent MA

## 2019-05-30 NOTE — PATIENT INSTRUCTIONS
Use drops either Debrox or liquid Colace    Schedule EMG      Earwax, Home Treatment    Everyone produces earwax from the lining of the ear canal. It serves to lubricate and protect the ear. The wax that forms in the canal naturally moves toward the outside of the ear and falls out. Sometimes the ear canal may contain too much wax. This can cause a blockage and loss of hearing. Directions are given below for home treatment.  Home care  If your doctor has advised you to remove a wax blockage yourself, follow these directions:    Unless a medicine was prescribed, you may use an over-the-counter product made for clearing earwax. These contain carbamide peroxide. Lie down with the blocked ear facing upward. Apply one dropper full of medicine and wait a few minutes. Grasp the outer ear and wiggle it to help the solution enter the canal.    Lean over a sink or basin with the blocked ear facing downward. Use a bulb syringe filled with warm (not hot or cold) water to rinse the ear several times. Use gentle pressure only.    If you are having trouble draining the water out of your ear canal, put a few drops of rubbing alcohol (isopropyl alcohol) into the ear canal. This will help remove the remaining water.    Repeat this procedure once a day for up to three days, or until your hearing is back to normal. Do not use this treatment for more than three days in a row.  Don ts    Don t use cold water to rinse the ear. This will make you dizzy.    Don t perform this procedure if you have an ear infection.    Don t perform this procedure if you have a ruptured eardrum.    Don t use cotton swabs, matches, hairpins, keys, or other objects to  clean  the ear canal. This can cause infection of the ear canal or rupture the eardrum. Because of their size and shape, cotton swabs can push earwax deeper into the ear canal instead of removing it.  Follow-up care  Follow up with your health care provider if you are not improving after three  cleaning attempts, or as advised.  When to seek medical advice  Call your health care provider right away if any of these occur:    Worsening ear pain    Fever of 101 F (38.3 C) or higher, or as directed by your health care provider    Hearing does not return to normal after three days of treatment    Fluid drainage or bleeding from the ear canal    Swelling, redness, or tenderness of the outer ear    Headache, neck pain, or stiff neck    9702-1026 [employer]. [address] [place] [address], [place] [address]. All rights reserved. This information is not intended as a substitute for professional medical care. Always follow your healthcare professional's instructions.

## 2019-06-16 ENCOUNTER — MYC REFILL (OUTPATIENT)
Dept: FAMILY MEDICINE | Facility: CLINIC | Age: 24
End: 2019-06-16

## 2019-06-16 DIAGNOSIS — Z30.011 ENCOUNTER FOR INITIAL PRESCRIPTION OF CONTRACEPTIVE PILLS: ICD-10-CM

## 2019-06-16 NOTE — LETTER
[facility]  [address] [place] [id number]  Phone: [phone]    06/18/19    Agnes Woods  [address]  [place] [address]      To whom it may concern:     In order to ensure we are providing the best quality care, we have reviewed your chart and see that you are due for an annual physical as of 7/11/19.    We have also sent your medication to your pharmacy. For future medication refills, please contact your primary care clinic to schedule an appointment. This can be requested via SimpleRelevance or by calling the clinic at [phone].    We greatly appreciate the opportunity to serve you. Thank you for trusting us with your health care.      Sincerely,      Your care team at [facility]

## 2019-06-18 RX ORDER — NORETHINDRONE ACETATE AND ETHINYL ESTRADIOL .03; 1.5 MG/1; MG/1
1 TABLET ORAL DAILY
Qty: 84 TABLET | Refills: 0 | Status: SHIPPED | OUTPATIENT
Start: 2019-06-18 | End: 2019-07-02

## 2019-06-18 NOTE — TELEPHONE ENCOUNTER
"Requested Prescriptions   Pending Prescriptions Disp Refills     norethindrone-ethinyl estradiol (MICROGESTIN 1.5/30) 1.5-30 MG-MCG tablet  Last Written Prescription Date:  07/11/2019  Last Fill Quantity: 84 tablet,  # refills: 3   Last Office Visit: 5/30/2019   Future Office Visit:     84 tablet 3     Sig: Take 1 tablet by mouth daily       Contraceptives Protocol Passed - 6/17/2019  4:04 PM        Passed - Patient is not a current smoker if age is 35 or older        Passed - Recent (12 mo) or future (30 days) visit within the authorizing provider's specialty     Patient had office visit in the last 12 months or has a visit in the next 30 days with authorizing provider or within the authorizing provider's specialty.  See \"Patient Info\" tab in inbasket, or \"Choose Columns\" in Meds & Orders section of the refill encounter.          Passed - Medication is active on med list        Passed - No active pregnancy on record        Passed - No positive pregnancy test in past 12 months          "

## 2019-07-02 ENCOUNTER — OFFICE VISIT (OUTPATIENT)
Dept: MIDWIFE SERVICES | Facility: CLINIC | Age: 24
End: 2019-07-02
Payer: COMMERCIAL

## 2019-07-02 VITALS
HEART RATE: 106 BPM | WEIGHT: 142 LBS | BODY MASS INDEX: 22.24 KG/M2 | DIASTOLIC BLOOD PRESSURE: 77 MMHG | SYSTOLIC BLOOD PRESSURE: 121 MMHG

## 2019-07-02 DIAGNOSIS — Z30.41 VISIT FOR BIRTH CONTROL PILLS MAINTENANCE: ICD-10-CM

## 2019-07-02 DIAGNOSIS — Z00.00 ANNUAL PHYSICAL EXAM: Primary | ICD-10-CM

## 2019-07-02 PROCEDURE — 99385 PREV VISIT NEW AGE 18-39: CPT | Performed by: ADVANCED PRACTICE MIDWIFE

## 2019-07-02 RX ORDER — NORETHINDRONE ACETATE AND ETHINYL ESTRADIOL .03; 1.5 MG/1; MG/1
1 TABLET ORAL DAILY
Qty: 84 TABLET | Refills: 3 | Status: SHIPPED | OUTPATIENT
Start: 2019-07-02

## 2019-07-02 RX ORDER — VENLAFAXINE HYDROCHLORIDE 75 MG/1
75 TABLET, EXTENDED RELEASE ORAL
Qty: 30 EACH | Refills: 3 | Status: CANCELLED | OUTPATIENT
Start: 2019-07-02

## 2019-07-02 NOTE — NURSING NOTE
"Chief Complaint   Patient presents with     Physical       Initial /77   Pulse 106   Wt 64.4 kg (142 lb)   LMP 06/14/2019   BMI 22.24 kg/m   Estimated body mass index is 22.24 kg/m  as calculated from the following:    Height as of 1/2/18: 1.702 m (5' 7\").    Weight as of this encounter: 64.4 kg (142 lb).  BP completed using cuff size: regular    Questioned patient about current smoking habits.  Pt. has never smoked.      No obstetric history on file.    The following HM Due: NONE      The following patient reported/Care Every where data was sent to:  P ABSTRACT QUALITY INITIATIVES [[id number]]        N/a        Selma Rico MA                "

## 2019-07-02 NOTE — PROGRESS NOTES
Agnes is a 24 year old No obstetric history on file. female who presents for annual exam.     Menses are regular q 28-30 days and normal lasting 5 days.  Menses flow: normal.  Patient's last menstrual period was 06/14/2019.. Using oral contraceptives for contraception.  She is not currently considering pregnancy.  Has a history of mental health concerns, but not taking any psych meds and feeling stable and happy.  Recently got .    Besides routine health maintenance, she has no other health concerns today .  GYNECOLOGIC HISTORY:  Menarche: 11  Agnes is sexually active with 1 male partner(s) and is currently in monogamous relationship with .    History sexually transmitted infections:No STD history  STI testing offered?  Declined  KYLEE exposure: Unknown  History of abnormal Pap smear: NO - age 21-29 PAP every 3 years recommended  Family history of breast CA: No  Family history of uterine/ovarian CA: No    Family history of colon CA: No    HEALTH MAINTENANCE:  Cholesterol: (No results found for: CHOL History of abnormal lipids: No    Mammo:  . History of abnormal Mammo: Not applicable.  Regular Self Breast Exams: No    Current or Past (Physical, Sexual or Emotional):  No  Do you feel safe in your environment:  Yes    Calcium/Vitamin D intake: source:  dairy, dietary supplement(s) Adequate? Yes   Last TDAP? 2017 Offered today? No    TSH: (  TSH   Date Value Ref Range Status   12/04/2017 0.72 0.40 - 4.00 mU/L Final    )  Pap; (  Lab Results   Component Value Date    PAP NIL 07/11/2018    )    HISTORY:  OB History   No data available     Past Medical History:   Diagnosis Date     Anxiety      Depressive disorder      No past surgical history on file.  Family History   Problem Relation Age of Onset     Family History Negative Mother      Family History Negative Father      Social History     Socioeconomic History     Marital status:      Spouse name: None     Number of children: None     Years  of education: None     Highest education level: None   Occupational History     None   Social Needs     Financial resource strain: None     Food insecurity:     Worry: None     Inability: None     Transportation needs:     Medical: None     Non-medical: None   Tobacco Use     Smoking status: Light Tobacco Smoker     Packs/day: 0.25     Smokeless tobacco: Never Used   Substance and Sexual Activity     Alcohol use: Yes     Alcohol/week: 0.0 oz     Comment: Drinks alcohol most days.      Drug use: No     Sexual activity: Yes     Partners: Male     Birth control/protection: OCP   Lifestyle     Physical activity:     Days per week: None     Minutes per session: None     Stress: None   Relationships     Social connections:     Talks on phone: None     Gets together: None     Attends Scientologist service: None     Active member of club or organization: None     Attends meetings of clubs or organizations: None     Relationship status: None     Intimate partner violence:     Fear of current or ex partner: None     Emotionally abused: None     Physically abused: None     Forced sexual activity: None   Other Topics Concern     Parent/sibling w/ CABG, MI or angioplasty before 65F 55M? Not Asked   Social History Narrative     None       Current Outpatient Medications:      norethindrone-ethinyl estradiol (MICROGESTIN 1.5/30) 1.5-30 MG-MCG tablet, Take 1 tablet by mouth daily, Disp: 84 tablet, Rfl: 3   No Known Allergies    Past medical, surgical, social and family history were reviewed and updated in EPIC.    ROS:   C:     NEGATIVE for fever, chills, change in weight  I:       NEGATIVE for worrisome rashes, moles or lesions  E:     NEGATIVE for vision changes or irritation  E/M: NEGATIVE for ear, mouth and throat problems  R:     NEGATIVE for significant cough or SOB  CV:   NEGATIVE for chest pain, palpitations or peripheral edema  GI:     NEGATIVE for nausea, abdominal pain, heartburn, or change in bowel habits  :   NEGATIVE for  frequency, dysuria, hematuria, vaginal discharge, or irregular bleeding  M:     NEGATIVE for significant arthralgias or myalgia  N:      NEGATIVE for weakness, dizziness or paresthesias  E:      NEGATIVE for temperature intolerance, skin/hair changes  P:      NEGATIVE for changes in mood or affect.    EXAM:  /77   Pulse 106   Wt 64.4 kg (142 lb)   LMP 06/14/2019   BMI 22.24 kg/m     BMI: Body mass index is 22.24 kg/m .  Constitutional: healthy, alert and no distress  Head: Normocephalic. No masses, lesions, tenderness or abnormalities  Neck: Neck supple. Trachea midline. No adenopathy. Thyroid symmetric, normal size.   Cardiovascular: RRR.   Respiratory: Negative.   Breast: No nodularity, asymmetry or nipple discharge bilaterally.  Gastrointestinal: Abdomen soft, non-tender, non-distended. No masses, organomegaly.  : Pelvic exam offered and declined.  Pap up to date.    Musculoskeletal: extremities normal  Skin: no suspicious lesions or rashes.  Cutting scars noted on both arms   Psychiatric: Affect appropriate, cooperative,mentation appears normal.     COUNSELING:   Reviewed preventive health counseling, as reflected in patient instructions       Regular exercise       Healthy diet/nutrition       Contraception       Family planning       Folic Acid Counseling   reports that she has been smoking.  She has been smoking about 0.25 packs per day. She has never used smokeless tobacco.  Tobacco Cessation Action Plan: Information offered: Patient not interested at this time  Body mass index is 22.24 kg/m .    FRAX Risk Assessment    ASSESSMENT:  24 year old female with satisfactory annual exam  OCP Maintenance - refilled for 1 year.

## 2019-07-08 ENCOUNTER — OFFICE VISIT (OUTPATIENT)
Dept: NEUROLOGY | Facility: CLINIC | Age: 24
End: 2019-07-08
Payer: COMMERCIAL

## 2019-07-08 DIAGNOSIS — S64.11XS: Primary | ICD-10-CM

## 2019-07-08 NOTE — PROGRESS NOTES
[facility]  Electrodiagnostic Laboratory    Nerve Conduction & EMG Report          Patient:       Agnes Woods  Patient ID:    [id number]  Gender:        Female  YOB: 1995  Age:           24 Years 2 Months        History & Examination:  Ms. Sexton referred Agnes Woods for a right upper extremity EMG for evaluation of a right hand injury related to self-inflicted laceration.  The injury occurred 1-1/2 years ago.  The patient reports that she does have some numbness in the thumb through fourth digit as well as some mild weakness with  which she is associated more with numbness than a true weakness.  She thinks that symptoms did get better but have not completely resolved.  She does not notice the position makes a difference in her current symptoms and they are fairly constant.    Brief examination today the patient has full strength in the hand.  She has numbness to light touch in the thumb and index finger.  She has a subjective difference in light touch along the radial and ulnar aspect of the fourth digit.  The patient has a scar running along her right wrist and wanted to know fashion.  There is some scar tissue that is creating an enlargement just proximal to the wrist.  She reports that it is minorly tender to palpation.    Techniques: Motor conduction studies were done with surface recording electrodes. Sensory conduction studies were performed with surface electrodes, unless indicated otherwise by (n), designating the use of subdermal recording electrodes. Temperature was monitored and recorded throughout the study. Upper extremities were maintained at a temperature of 32 degrees Centigrade or higher.  Lower extremities were maintained at a temperature of 31degrees Centigrade or higher. EMG was done with a concentric needle electrode.        Results:  Sensory nerve conduction studies:  Median antidromic sensory study reveals a low amplitude and slowed conduction  velocity.  Ulnar antidromic sensory study is within normal limits.    Motor nerve conduction studies:  Right median motor nerve conduction study recording over the APB reveals a normal distal latency, low amplitude and normal conduction velocity.  Right ulnar motor nerve conduction study is within normal limits.  Bryan Jac anastomosis is present.  Lumbrical studies reveal a substantial difference in distal latency when comparing second lumbrical to palmar interosseous muscle showing a delay with median nerve stimulation.     Needle examination:  Needle examination was normal in the flexor carpi radialis and flexor pollicis longus.  In the abductor pollicis brevis the patient had increased insertional activity with 2+ spontaneous fibrillation potentials.  With activation the patient had significantly increased amplitude and duration motor unit potentials that were moderately reduced with recruitment pattern.  First dorsal interosseous muscle was normal with needle examination.      Interpretation:  This is an abnormal EMG.  Findings are consistent with a median nerve lesion localizing somewhere in the wrist but most likely at the location of historical laceration.  Clinical correlation is recommended.  There is evidence of uncompensated denervation present at this time and if symptoms are worsening clinical correlation with possible impingement from scar tissue could be considered.      EMG Physician:  Suzie Jones MD FAAN         Sensory NCS      Nerve / Sites Rec. Site Onset Peak NP Amp Ref. PP Amp Dist James Ref. Temp     ms ms  V  V  V cm m/s m/s  C   R MEDIAN - Dig II Anti      Wrist Dig II 3.80 5.10 5.7 10.0 10.2 14 36.8 48.0 32.5      Wrist Dig II 4.06 5.26 2.0  1.9 14 34.5  32.5   R ULNAR - Dig V      Dig V Wrist 2.14 2.81 8.6 8.0 9.8 12.5 58.5 48.0 32.4       Motor NCS      Nerve / Sites Rec. Site Lat Ref. Amp Ref. Rel Amp Dist James Ref. Dur. Area Temp.     ms ms mV mV % cm m/s m/s ms %  C   R MEDIAN - APB       Wrist APB 4.32 4.40 3.0 5.0 100 8   6.35 100 32.4      Elbow APB 8.39  3.2  104 22.5 55.4 48.0 7.03 97.1 32.4      MG Check ADM 7.19  0.5  16.5    4.01 21.9 32.4   R ULNAR - ADM      Wrist ADM 2.40 3.50 14.3 5.0 100 8   7.97 100 32.4      B.Elbow ADM 5.68  12.8  90 20 61.0 48.0 7.14 91.4 32.4      A.Elbow ADM 7.29  12.5  87.9 10 61.9 48.0 7.14 94.6 32.4   R MEDIAN - II Lumb      Median II Lumb 4.64  0.6  100 10   7.03 100 32.5      Ulnar Palm Int 2.34  10.3  1703 10   5.05 884 32.5       EMG Summary Table     Spontaneous MUAP Recruitment    IA Fib/PSW Fasc H.F. Amp Dur. PPP Pattern   R. FLEX CARPI RAD N None None None N N N N   R. FLEX POLL LONG N None None None N N N N   R. FIRST D INTEROSS N None None None N N N N   R. ABD POLL BREVIS Increased 2+ None None 2+ 2+ N Moderately Reduced

## 2019-07-08 NOTE — LETTER
7/8/2019       RE: Agnes Woods  [address]  [place] [address]     Dear Colleague,    Thank you for referring your patient, Agnes Woods, to the [facility] EMG at [facility]. Please see a copy of my visit note below.     [facility]  Electrodiagnostic Laboratory    Nerve Conduction & EMG Report          Patient:       Agnes Woods  Patient ID:    [id number]  Gender:        Female  YOB: 1995  Age:           24 Years 2 Months        History & Examination:  Ms. Sexton referred Agnes Woods for a right upper extremity EMG for evaluation of a right hand injury related to self-inflicted laceration.  The injury occurred 1-1/2 years ago.  The patient reports that she does have some numbness in the thumb through fourth digit as well as some mild weakness with  which she is associated more with numbness than a true weakness.  She thinks that symptoms did get better but have not completely resolved.  She does not notice the position makes a difference in her current symptoms and they are fairly constant.    Brief examination today the patient has full strength in the hand.  She has numbness to light touch in the thumb and index finger.  She has a subjective difference in light touch along the radial and ulnar aspect of the fourth digit.  The patient has a scar running along her right wrist and wanted to know fashion.  There is some scar tissue that is creating an enlargement just proximal to the wrist.  She reports that it is minorly tender to palpation.    Techniques: Motor conduction studies were done with surface recording electrodes. Sensory conduction studies were performed with surface electrodes, unless indicated otherwise by (n), designating the use of subdermal recording electrodes. Temperature was monitored and recorded throughout the study. Upper extremities were maintained at a temperature of 32 degrees Centigrade or  higher.  Lower extremities were maintained at a temperature of 31degrees Centigrade or higher. EMG was done with a concentric needle electrode.        Results:  Sensory nerve conduction studies:  Median antidromic sensory study reveals a low amplitude and slowed conduction velocity.  Ulnar antidromic sensory study is within normal limits.    Motor nerve conduction studies:  Right median motor nerve conduction study recording over the APB reveals a normal distal latency, low amplitude and normal conduction velocity.  Right ulnar motor nerve conduction study is within normal limits.  Bryan Jac anastomosis is present.  Lumbrical studies reveal a substantial difference in distal latency when comparing second lumbrical to palmar interosseous muscle showing a delay with median nerve stimulation.     Needle examination:  Needle examination was normal in the flexor carpi radialis and flexor pollicis longus.  In the abductor pollicis brevis the patient had increased insertional activity with 2+ spontaneous fibrillation potentials.  With activation the patient had significantly increased amplitude and duration motor unit potentials that were moderately reduced with recruitment pattern.  First dorsal interosseous muscle was normal with needle examination.      Interpretation:  This is an abnormal EMG.  Findings are consistent with a median nerve lesion localizing somewhere in the wrist but most likely at the location of historical laceration.  Clinical correlation is recommended.  There is evidence of uncompensated denervation present at this time and if symptoms are worsening clinical correlation with possible impingement from scar tissue could be considered.      EMG Physician:  Suzie Jones MD FAAN         Sensory NCS      Nerve / Sites Rec. Site Onset Peak NP Amp Ref. PP Amp Dist James Ref. Temp     ms ms  V  V  V cm m/s m/s  C   R MEDIAN - Dig II Anti      Wrist Dig II 3.80 5.10 5.7 10.0 10.2 14 36.8 48.0 32.5       Wrist Dig II 4.06 5.26 2.0  1.9 14 34.5  32.5   R ULNAR - Dig V      Dig V Wrist 2.14 2.81 8.6 8.0 9.8 12.5 58.5 48.0 32.4       Motor NCS      Nerve / Sites Rec. Site Lat Ref. Amp Ref. Rel Amp Dist James Ref. Dur. Area Temp.     ms ms mV mV % cm m/s m/s ms %  C   R MEDIAN - APB      Wrist APB 4.32 4.40 3.0 5.0 100 8   6.35 100 32.4      Elbow APB 8.39  3.2  104 22.5 55.4 48.0 7.03 97.1 32.4      MG Check ADM 7.19  0.5  16.5    4.01 21.9 32.4   R ULNAR - ADM      Wrist ADM 2.40 3.50 14.3 5.0 100 8   7.97 100 32.4      B.Elbow ADM 5.68  12.8  90 20 61.0 48.0 7.14 91.4 32.4      A.Elbow ADM 7.29  12.5  87.9 10 61.9 48.0 7.14 94.6 32.4   R MEDIAN - II Lumb      Median II Lumb 4.64  0.6  100 10   7.03 100 32.5      Ulnar Palm Int 2.34  10.3  1703 10   5.05 884 32.5       EMG Summary Table     Spontaneous MUAP Recruitment    IA Fib/PSW Fasc H.F. Amp Dur. PPP Pattern   R. FLEX CARPI RAD N None None None N N N N   R. FLEX POLL LONG N None None None N N N N   R. FIRST D INTEROSS N None None None N N N N   R. ABD POLL BREVIS Increased 2+ None None 2+ 2+ N Moderately Reduced                      Again, thank you for allowing me to participate in the care of your patient.      Sincerely,    Suzie Jones MD

## 2020-01-29 ENCOUNTER — TRANSFERRED RECORDS (OUTPATIENT)
Dept: HEALTH INFORMATION MANAGEMENT | Facility: CLINIC | Age: 25
End: 2020-01-29

## 2020-02-06 ENCOUNTER — MEDICAL CORRESPONDENCE (OUTPATIENT)
Dept: HEALTH INFORMATION MANAGEMENT | Facility: CLINIC | Age: 25
End: 2020-02-06

## 2020-02-18 ENCOUNTER — TRANSCRIBE ORDERS (OUTPATIENT)
Dept: OTHER | Age: 25
End: 2020-02-18

## 2020-02-18 DIAGNOSIS — G56.10: Primary | ICD-10-CM

## 2020-02-18 NOTE — TELEPHONE ENCOUNTER
RECORDS RECEIVED FROM: Median nerve neuropathy- injury following laceration to wrist 2 years ago- Referred by Suzie Guerrero with [facility]. Pt had EMG done in July 2019 at [facility] [place], no other imaging, no previous surgeries per pt   DATE RECEIVED: Feb 26, 2020     NOTES STATUS DETAILS   OFFICE NOTE from referring provider Received    OFFICE NOTE from other specialist N/A    DISCHARGE SUMMARY from hospital N/A    DISCHARGE REPORT from the ER N/A    OPERATIVE REPORT N/A    MEDICATION LIST Internal    IMPLANT RECORD/STICKER N/A    LABS     CBC/DIFF N/A    CULTURES N/A    INJECTIONS DONE IN RADIOLOGY N/A    MRI N/A    CT SCAN N/A    XRAYS (IMAGES & REPORTS) N/A    TUMOR     PATHOLOGY  Slides & report N/A      02/18/20   4:52 PM   Pre-visit complete  Diana Glamm, CMA

## 2020-02-26 ENCOUNTER — PRE VISIT (OUTPATIENT)
Dept: ORTHOPEDICS | Facility: CLINIC | Age: 25
End: 2020-02-26

## 2020-02-26 ENCOUNTER — OFFICE VISIT (OUTPATIENT)
Dept: ORTHOPEDICS | Facility: CLINIC | Age: 25
End: 2020-02-26
Payer: COMMERCIAL

## 2020-02-26 DIAGNOSIS — G56.10 MEDIAN NERVE NEUROPATHY, UNSPECIFIED LATERALITY: Primary | ICD-10-CM

## 2020-02-26 RX ORDER — ACETAMINOPHEN AND CODEINE PHOSPHATE 120; 12 MG/5ML; MG/5ML
0.35 SOLUTION ORAL DAILY
COMMUNITY
Start: 2020-01-27

## 2020-02-26 ASSESSMENT — ENCOUNTER SYMPTOMS
SINUS PAIN: 0
BACK PAIN: 1
TASTE DISTURBANCE: 0
NERVOUS/ANXIOUS: 1
MEMORY LOSS: 0
HEADACHES: 1
DEPRESSION: 1
NECK MASS: 0
NECK PAIN: 1
MYALGIAS: 0
SMELL DISTURBANCE: 0
HOARSE VOICE: 0
NUMBNESS: 0
ARTHRALGIAS: 0
SEIZURES: 0
DIZZINESS: 0
JOINT SWELLING: 0
INSOMNIA: 0
TINGLING: 1
DECREASED CONCENTRATION: 1
DISTURBANCES IN COORDINATION: 0
PANIC: 0
SORE THROAT: 0
PARALYSIS: 0
TREMORS: 0
MUSCLE CRAMPS: 0
TROUBLE SWALLOWING: 0
WEAKNESS: 0
SINUS CONGESTION: 0
SPEECH CHANGE: 0
STIFFNESS: 0
LOSS OF CONSCIOUSNESS: 0
MUSCLE WEAKNESS: 0

## 2020-02-26 ASSESSMENT — PATIENT HEALTH QUESTIONNAIRE - PHQ9
SUM OF ALL RESPONSES TO PHQ QUESTIONS 1-9: 10
SUM OF ALL RESPONSES TO PHQ QUESTIONS 1-9: 10
10. IF YOU CHECKED OFF ANY PROBLEMS, HOW DIFFICULT HAVE THESE PROBLEMS MADE IT FOR YOU TO DO YOUR WORK, TAKE CARE OF THINGS AT HOME, OR GET ALONG WITH OTHER PEOPLE: SOMEWHAT DIFFICULT

## 2020-02-26 NOTE — NURSING NOTE
"[facility]:  PHQ-9 Screening Note  SITUATION/BACKGROUND                                                    Agnes Woods is a 24 year old female who completed the PHQ-9 assessment for depression and Score is >9.  Total score=10    Onset of symptoms: Chronic    Prior history of suicide attempt or self harm: Yes  Risk Factors: Age, previous history depression with suicidal ideation  History of depression or mental illness: Yes  Medications reviewed: Yes     ASSESSMENT      A. Are any of the following present?      Suicidal thoughts with a plan and means to carry out the plan?    Intent to harm others    Altered mental status: confusion, delusional, psychotic NO - go to B   B. Are any of the following present?      Suicidal thoughts without a plan or means to carry out the plan    New onset of delusional ideas    Past inpatient admission for depression    New onset and recent change or addition of new medication NO - go to C   C. Are any of the following present?      Previous suicide attempts    Depression interfering with ability to work or function    Loss of appetite and eating poorly    Abrupt cessation of drugs (OTC or RX), alcohol or caffeine    Drug or alcohol abuse YES -  Provide patient with crisis resources.     Place referral to behavioral health team for \"regular\" follow-up   D. Are several of the following present?      Difficulty concentrating    Difficulty sleeping    Reduced interest in sexual activity or impotency    Irregular or absent menstruation    No interest in activity    Change in interpersonal relationships    Increased use/abuse of alcohol or drugs    Pregnant or recent child birth    Recent major life change    History of depression NO - provide home care instructions       Patient has a history of chronic depression with suicidal ideation.  She is currently under a treatment plan and declines for anyone to contact her regarding help or resources re: depression " today.  PLAN      Home Care Instructions:   If currently in counseling, call counselor for appointment  Call local crisis interventions  Contact friends or family for support  Increase exercise and enjoyable activities  East a well-balanced diet, drink plenty of fluids and rest as needed  Alcohol and other recreational drugs can worsen depression.  If heavy use of drugs or alcohol, contact counselor or PCP to help reduce consumption.    Report the following to your PCP:       Seek emergency care immediately if any of the following occur:   Suicidal thoughts and plan and means to carry out the plan  Injury to self or others    BEHAVIORAL HEALTH TEAMS      [facility] [place] - Behavioral Health Team    [facility] [place] Pager: [phone]    [place]  - Behavioral Health Team    Pager number: [phone]    Referral to Behavioral Health    BEHAVIORAL / SPIRITUAL HEALTH SOWQ [03199}    RESOURCES      - 24/7 Crisis Hotlines: National Suicide Prevention Hotline  901-471-DBJV (6543)    Monica Elliott RN        Copyright 2016 [employer]

## 2020-02-26 NOTE — PROGRESS NOTES
[facility]  Orthopedics  Sid Dobbs MD  2020     Name: Agnes Woods  MRN: [id number]  Age: 24 year old  : 1995  Referring provider: Suzie Williamson     Chief Complaint: Right median nerve neuropathy     Date of Injury: 2 years prior    History of Present Illness:   Agnes Woods is a 24 year old female who presents today for evaluation of right median nerve neuropathy. About a year ago the patient sustained a self-inflicted laceration to the right wrist. She initially had some motor and sensory deficits. She thinks that symptoms did get better but have not completely resolved. Compared to immediately post injury she has more motor function and sensation, but her sensation is still quite limited.  She feels that her hand is not as strong as the other side but she can do most exercises.  She was seen by Dr. Jones of Neurology and had an EMG performed on 2019, which was consistent with a prior median nerve injury, with some evidence of residual denervation, also a maicol charito anastomosis.      The patient's biggest concern at this time is activity related pain in the hand and thenar eminence.  The pain is described as burning and aching.  She feels that it is worse then typing or playing piano.  She has previously tried bracing with incomplete relief.     She reports that she has concerns about trying medications for nerve pain as these may impact her overall mood, mental health.    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  She is right handed.     She has questions regarding the treatment of her ongoing condition.    Review of Systems:   A 10-point review of systems was obtained and is negative except for as noted in the HPI.     Medications:   Norethindrone-ethinyl estradiol (MICROGESTIN .) 1.5-30 MG-MCG tablet, Take 1 tablet by mouth daily, Disp: 84 tablet, Rfl: 3     Allergies:  Patient has no known allergies.      Past Medical History:  Anxiety  Depression       Past Surgical History:  No pertinent surgical history     Social History:  Patient is a light cigarette smoker (0.25 ppd). No smokeless tobacco.   Alcohol Use: Yes  Drug Use: No      Student at [employer] studying visual arts    Family History:  No bleeding or clotting conditions are known to patient in the family.     Physical Examination:  General: Well-appearing, stated age, no acute distress  Respiratory: Nonlabored breathing on room air, symmetric chest rise, no audible wheezing  Cardiovascular: Warm well perfused nonedematous noncyanotic 70s  Eyes: Anicteric sclera, noninjected conjunctive a  ENT: Moist mucous membranes, good dentition, hearing intact to spoken word  Skin: No obvious lesions or rashes in the exposed areas  Neuro: No focal neurologic deficits, voluntary control of extremities x4, appropriate gaze and referencing about the room  Psych: Appropriate mood and affect today    Musculoskeletal:    Right upper extremity:    Well healed scar over the right volar forearm and wrist    Minimal pain with palpation over the wrist, thenar eminence, and hand.  Normal passive range of motion of hand and wrist.    Warm and well perfused in hand and all fingers. 2+ right radial pulse.    Sensation to 2 point discrimination  Thumb: >10 mm  Index: >10 mm   Long: >10 mm  Ring (ulnar): 5 mm  Small: 5mm    Light touch discrimination intact in thumb, index, ring, and small fingers.  Decreased light touch discrimination in the long finger.    Able to make a full fist    Motor:   4+/5 strength in the opponens  5/5 EPL  5/5 FPL  5/5 intrinsics    Positive tinels sign for shooting pain in the thumb, index, long fingers with palpation along the entire extent of the scar. Negative with distal palpation along the nerve course.    EMG performed 7/8/2019:   This is an abnormal EMG.  Findings are consistent with a median nerve lesion localizing somewhere in the wrist but most likely at the location of historical laceration.   Clinical correlation is recommended.  There is evidence of uncompensated denervation present at this time and if symptoms are worsening clinical correlation with possible impingement from scar tissue could be considered.    Assessment:   24 year old female with a median nerve injury from a laceration in 2018 with residual deficits in sensation and motor (mild) strength impairments and a EMG (7/8/2019) showing persistent denervation    1. Traumatic median (right) nerve injury from laceration in 2018 with persistent sensory defects, mild motor impairment, and associated pain.    Plan:     We reviewed the clinical and radiographic findings with the patient.  We discussed the etiology of her ongoing symptoms as a possible neuroma.  We reviewed the results of the EMG with her.  We recommended a Ultrasound of the median nerve to look for a neuroma.  We discussed with the patient we would reviewed the results of this study with her and then discussed further treatment options with may include excision versus neurolysis versus observation..    The patient is encouraged to continue to work on strengthening and range of motion in the interim.      She is happy with the plan of care and all questions were answered to her satisfaction.    Seen and evaluated with Dr. Dobbs,    Butch Raimrez MD  Orthopaedic Surgery PGY-4      I, Sid Dobbs, saw and evaluated this patient with the resident and agree with the resident s findings and plan of care as documented in the resident s note.     Answers for HPI/ROS submitted by the patient on 2/26/2020   If you checked off any problems, how difficult have these problems made it for you to do your work, take care of things at home, or get along with other people?: Somewhat difficult  PHQ9 TOTAL SCORE: 10  General Symptoms: No  Skin Symptoms: No  HENT Symptoms: Yes  EYE SYMPTOMS: No  HEART SYMPTOMS: No  LUNG SYMPTOMS: No  INTESTINAL SYMPTOMS: No  URINARY SYMPTOMS: No  GYNECOLOGIC  SYMPTOMS: No  BREAST SYMPTOMS: No  SKELETAL SYMPTOMS: Yes  BLOOD SYMPTOMS: No  NERVOUS SYSTEM SYMPTOMS: Yes  MENTAL HEALTH SYMPTOMS: Yes  Ear pain: Yes  Ear discharge: No  Hearing loss: No  Tinnitus: Yes  Nosebleeds: No  Congestion: No  Sinus pain: No  Trouble swallowing: No   Voice hoarseness: No  Mouth sores: No  Sore throat: No  Tooth pain: No  Gum tenderness: No  Bleeding gums: No  Change in taste: No  Change in sense of smell: No  Dry mouth: No  Hearing aid used: No  Neck lump: No  Back pain: Yes  Muscle aches: No  Neck pain: Yes  Swollen joints: No  Joint pain: No  Bone pain: No  Muscle cramps: No  Muscle weakness: No  Joint stiffness: No  Bone fracture: No  Trouble with coordination: No  Dizziness or trouble with balance: No  Fainting or black-out spells: No  Memory loss: No  Headache: Yes  Seizures: No  Speech problems: No  Tingling: Yes  Tremor: No  Weakness: No  Difficulty walking: No  Paralysis: No  Numbness: No  Nervous or Anxious: Yes  Depression: Yes  Trouble sleeping: No  Trouble thinking or concentrating: Yes  Mood changes: No  Panic attacks: No

## 2020-02-26 NOTE — LETTER
2020       RE: Agnes Woods  [address]  [facility] [place] [facility] [address]     Dear Colleague,    Thank you for referring your patient, Agnes Woods, to the [facility] at [facility]. Please see a copy of my visit note below.    [facility]  Orthopedics  Sid Dobbs MD  2020     Name: Agnes Woods  MRN: [id number]  Age: 24 year old  : 1995  Referring provider: Suzie Williamson     Chief Complaint: Right median nerve neuropathy     Date of Injury: 2 years prior    History of Present Illness:   Agnes Woods is a 24 year old female who presents today for evaluation of right median nerve neuropathy. About a year ago the patient sustained a self-inflicted laceration to the right wrist. She initially had some motor and sensory deficits. She thinks that symptoms did get better but have not completely resolved. Compared to immediately post injury she has more motor function and sensation, but her sensation is still quite limited.  She feels that her hand is not as strong as the other side but she can do most exercises.  She was seen by Dr. Jones of Neurology and had an EMG performed on 2019, which was consistent with a prior median nerve injury, with some evidence of residual denervation, also a maicol charito anastomosis.      The patient's biggest concern at this time is activity related pain in the hand and thenar eminence.  The pain is described as burning and aching.  She feels that it is worse then typing or playing piano.  She has previously tried bracing with incomplete relief.     She reports that she has concerns about trying medications for nerve pain as these may impact her overall mood, mental health.    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  She is right handed.     She has questions regarding the treatment of her ongoing condition.    Review of Systems:   A 10-point review of systems was  obtained and is negative except for as noted in the HPI.     Medications:   Norethindrone-ethinyl estradiol (MICROGESTIN 1.5/30) 1.5-30 MG-MCG tablet, Take 1 tablet by mouth daily, Disp: 84 tablet, Rfl: 3     Allergies:  Patient has no known allergies.      Past Medical History:  Anxiety  Depression      Past Surgical History:  No pertinent surgical history     Social History:  Patient is a light cigarette smoker (0.25 ppd). No smokeless tobacco.   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     Physical Examination:  General: Well-appearing, stated age, no acute distress  Respiratory: Nonlabored breathing on room air, symmetric chest rise, no audible wheezing  Cardiovascular: Warm well perfused nonedematous noncyanotic 70s  Eyes: Anicteric sclera, noninjected conjunctive a  ENT: Moist mucous membranes, good dentition, hearing intact to spoken word  Skin: No obvious lesions or rashes in the exposed areas  Neuro: No focal neurologic deficits, voluntary control of extremities x4, appropriate gaze and referencing about the room  Psych: Appropriate mood and affect today    Musculoskeletal:    Right upper extremity:    Well healed scar over the right volar forearm and wrist    Minimal pain with palpation over the wrist, thenar eminence, and hand.  Normal passive range of motion of hand and wrist.    Warm and well perfused in hand and all fingers. 2+ right radial pulse.    Sensation to 2 point discrimination  Thumb: >10 mm  Index: >10 mm   Long: >10 mm  Ring (ulnar): 5 mm  Small: 5mm    Light touch discrimination intact in thumb, index, ring, and small fingers.  Decreased light touch discrimination in the long finger.    Able to make a full fist    Motor:   4+/5 strength in the opponens  5/5 EPL  5/5 FPL  5/5 intrinsics    Positive tinels sign for shooting pain in the thumb, index, long fingers with  palpation along the entire extent of the scar. Negative with distal palpation along the nerve course.    EMG performed 7/8/2019:   This is an abnormal EMG.  Findings are consistent with a median nerve lesion localizing somewhere in the wrist but most likely at the location of historical laceration.  Clinical correlation is recommended.  There is evidence of uncompensated denervation present at this time and if symptoms are worsening clinical correlation with possible impingement from scar tissue could be considered.    Assessment:   24 year old female with a median nerve injury from a laceration in 2018 with residual deficits in sensation and motor (mild) strength impairments and a EMG (7/8/2019) showing persistent denervation    1. Traumatic median (right) nerve injury from laceration in 2018 with persistent sensory defects, mild motor impairment, and associated pain.    Plan:     We reviewed the clinical and radiographic findings with the patient.  We discussed the etiology of her ongoing symptoms as a possible neuroma.  We reviewed the results of the EMG with her.  We recommended a Ultrasound of the median nerve to look for a neuroma.  We discussed with the patient we would reviewed the results of this study with her and then discussed further treatment options with may include excision versus neurolysis versus observation..    The patient is encouraged to continue to work on strengthening and range of motion in the interim.      She is happy with the plan of care and all questions were answered to her satisfaction.    Seen and evaluated with Butch Amos MD  Orthopaedic Surgery PGY-4    Sid Dobbs MD    I, Sid Dobbs, saw and evaluated this patient with the resident and agree with the resident s findings and plan of care as documented in the resident s note.

## 2020-02-26 NOTE — NURSING NOTE
Reason For Visit:   Chief Complaint   Patient presents with     Consult For     Median neuropathy due to laceration of right wrist       Primary MD: Selina Asif    ?  No    Age: 24 year old    Occupation: Student.  Date of injury: 12/15/17  Type of injury: Laceration.  Date of surgery: NA  Type of surgery: NA.  Smoker: No  Request smoking cessation information: No      There were no vitals taken for this visit.      Pain Assessment  Patient Currently in Pain: Yes  0-10 Pain Scale: 1  Primary Pain Location: Arm(Left)               QuickDASH Assessment  No flowsheet data found.       No Known Allergies    Ros Diaz ATC

## 2020-03-23 ENCOUNTER — TELEPHONE (OUTPATIENT)
Dept: NEUROLOGY | Facility: CLINIC | Age: 25
End: 2020-03-23

## 2020-04-03 DIAGNOSIS — G56.10 MEDIAN NERVE NEUROPATHY, UNSPECIFIED LATERALITY: Primary | ICD-10-CM

## 2020-04-03 DIAGNOSIS — Z53.9 ERRONEOUS ENCOUNTER--DISREGARD: ICD-10-CM

## 2020-04-03 NOTE — PROGRESS NOTES
This encounter was opened in error. Please disregard.   Problem: Goal Outcome Summary  Goal: Goal Outcome Summary  Outcome: Improving  Pt is A&O X4.  POD 10 from C6 and C7 Corpectomy, C5-T1 anterior instrumented fusion. Neck incision intact LISSETH. C-Collar on all times. Neuro intact except numbness and tingling RUE &RLE.Pain managed with suboxone, Lyrica and clonopin for anxiety. Pt refused Lidocaine patch. On a regular diet, good appetite. Transfer SBA, ambulated hallway. Bathroom voiding spontaneously.Calm and cooperative. Left neck IJ triple lumen, SL. Awaiting for TCU placement. Will continue to monitor and follow POC.

## 2020-04-03 NOTE — ADDENDUM NOTE
Addended by: RAYMON FENTON on: 4/3/2020 03:59 PM     Modules accepted: Orders, Level of Service, SmartSet

## 2020-04-16 ENCOUNTER — TELEPHONE (OUTPATIENT)
Dept: NEUROLOGY | Facility: CLINIC | Age: 25
End: 2020-04-16

## 2020-04-16 NOTE — TELEPHONE ENCOUNTER
[facility] Call Center    Phone Message    May a detailed message be left on voicemail: yes     Reason for Call: Other:   Tiffanie from Ortho states that pt had an EMG for 03/30 and it was canceled. Dr Dobbs in Ortho wants pt to complete EMG prior to their appt on 05/04. Is clinic scheduling EMG yet? Please call Tiffanie back.     Action Taken: Other:  neuro    Travel Screening: Not Applicable

## 2020-04-17 NOTE — TELEPHONE ENCOUNTER
See telephone call from 4-6-20.   This is NOT for an EMG.  This is for a nerve ultrasound in the EMG clinic.  The neurology clinic is aware and should be trying to reach out to schedule this test.    Test should be completed prior to the patient office visit with the hand surgeon on 5-4-20 if possible.    Please open up the EMG order and see the details.  It should not have been cancelled.     Monica Elliott, RN Care Coordinator for Orthopedic Hand clinic

## 2020-04-17 NOTE — TELEPHONE ENCOUNTER
Communicated with Neurology clinic scheduling staff.  Patient is scheduled for a nerve ultrasound on 5-19-20.  Patient informed and agrees to this appointment date and time.  Appointment with Dr Sid Dobbs moved to 5-27-20 with patient consent. Monica Elliott RN

## 2020-04-21 ENCOUNTER — TELEPHONE (OUTPATIENT)
Dept: ORTHOPEDICS | Facility: CLINIC | Age: 25
End: 2020-04-21

## 2020-05-13 ENCOUNTER — TELEPHONE (OUTPATIENT)
Dept: ORTHOPEDICS | Facility: CLINIC | Age: 25
End: 2020-05-13

## 2020-05-13 NOTE — TELEPHONE ENCOUNTER
Attempted to call patient to move her 5/27 appointment with Dr. Dobbs to the afternoon. No answer and voicemail not able to be left. [employer] message was sent and appointment was moved.

## 2020-05-20 ENCOUNTER — TELEPHONE (OUTPATIENT)
Dept: ORTHOPEDICS | Facility: CLINIC | Age: 25
End: 2020-05-20

## 2020-05-20 NOTE — TELEPHONE ENCOUNTER
Notification received from neurology that Socorro did not show to her appointment for a nerve ultrasound.  Dr Dobbs requests appointment for 5-27-20 to be cancelled, needs to see patient after the testing is completed.    Attempted to reach patient via cell phone, no answer, no voice mail available. My Chart message sent to patient now. Monica Elliott RN

## 2020-05-21 NOTE — TELEPHONE ENCOUNTER
Another attempt made to reach patient by telephone, no answer, no voice mail available, unable to leave message for patient. Monica Elliott RN

## 2020-05-27 NOTE — TELEPHONE ENCOUNTER
10:24 AM  Attempted 3rd time to reach patient by telephone, no answer, no voice mail available, unable to leave message for patient.  No response from patient from my chart. Monica Elliott RN

## 2020-12-27 ENCOUNTER — HEALTH MAINTENANCE LETTER (OUTPATIENT)
Age: 25
End: 2020-12-27

## 2021-10-09 ENCOUNTER — HEALTH MAINTENANCE LETTER (OUTPATIENT)
Age: 26
End: 2021-10-09

## 2022-01-29 ENCOUNTER — HEALTH MAINTENANCE LETTER (OUTPATIENT)
Age: 27
End: 2022-01-29

## 2022-05-24 NOTE — TELEPHONE ENCOUNTER
Sent email asking if she was okay and if she could call or email back to let providers know how she was doing.  Left out any confidential information.     Manual Repair Warning Statement: We plan on removing the manually selected variable below in favor of our much easier automatic structured text blocks found in the previous tab. We decided to do this to help make the flow better and give you the full power of structured data. Manual selection is never going to be ideal in our platform and I would encourage you to avoid using manual selection from this point on, especially since I will be sunsetting this feature. It is important that you do one of two things with the customized text below. First, you can save all of the text in a word file so you can have it for future reference. Second, transfer the text to the appropriate area in the Library tab. Lastly, if there is a flap or graft type which we do not have you need to let us know right away so I can add it in before the variable is hidden. No need to panic, we plan to give you roughly 6 months to make the change.

## 2022-09-11 ENCOUNTER — HEALTH MAINTENANCE LETTER (OUTPATIENT)
Age: 27
End: 2022-09-11

## 2023-05-06 ENCOUNTER — HEALTH MAINTENANCE LETTER (OUTPATIENT)
Age: 28
End: 2023-05-06
